# Patient Record
Sex: FEMALE | URBAN - METROPOLITAN AREA
[De-identification: names, ages, dates, MRNs, and addresses within clinical notes are randomized per-mention and may not be internally consistent; named-entity substitution may affect disease eponyms.]

---

## 2020-04-07 ENCOUNTER — OFFICE VISIT (OUTPATIENT)
Dept: URBAN - METROPOLITAN AREA CLINIC 62 | Facility: CLINIC | Age: 70
End: 2020-04-07
Payer: MEDICARE

## 2020-04-07 DIAGNOSIS — H49.21 SIXTH [ABDUCENT] NERVE PALSY, RIGHT EYE: Primary | ICD-10-CM

## 2020-04-07 DIAGNOSIS — E05.00 THYROTOXICOSIS WITH DIFFUSE GOITER WITHOUT THYROTOXIC CRISIS: ICD-10-CM

## 2020-04-07 DIAGNOSIS — H25.13 AGE-RELATED NUCLEAR CATARACT, BILATERAL: ICD-10-CM

## 2020-04-07 DIAGNOSIS — H40.013 OPEN ANGLE WITH BORDERLINE FINDINGS, LOW RISK, BILATERAL: ICD-10-CM

## 2020-04-07 PROCEDURE — 92133 CPTRZD OPH DX IMG PST SGM ON: CPT | Performed by: OPHTHALMOLOGY

## 2020-04-07 PROCEDURE — 92004 COMPRE OPH EXAM NEW PT 1/>: CPT | Performed by: OPHTHALMOLOGY

## 2020-04-07 ASSESSMENT — INTRAOCULAR PRESSURE
OD: 14
OS: 14

## 2020-04-07 NOTE — IMPRESSION/PLAN
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 Plan: R/O thyroid eye disease. with Free T3, T4, TSI. CT with and without contrast orbits (bun and cr, if warranted).

## 2020-04-07 NOTE — IMPRESSION/PLAN
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 Plan: Pt with history right eye lateral gaze palsy 15 years ago. High suspicion thyroid eye disease as has some symptoms of diplopia. Rec. imaging orbits and eval specialist.  Blood work for thyroid and glucose.

## 2020-04-07 NOTE — IMPRESSION/PLAN
Impression: Open angle with borderline findings, low risk, bilateral: H40.013. Plan: OCT and IOP NML. Will continue to observe.  + C/D assymetry and family hx. OCT with artifact OS. Repeat in the future. Check VF. No treatment at this time.

## 2020-04-09 ENCOUNTER — OFFICE VISIT (OUTPATIENT)
Dept: URBAN - METROPOLITAN AREA CLINIC 62 | Facility: CLINIC | Age: 70
End: 2020-04-09
Payer: MEDICARE

## 2020-04-09 DIAGNOSIS — H53.2 DIPLOPIA: ICD-10-CM

## 2020-04-09 DIAGNOSIS — H50.9 STRABISMUS: ICD-10-CM

## 2020-04-09 PROCEDURE — 99215 OFFICE O/P EST HI 40 MIN: CPT | Performed by: OPHTHALMOLOGY

## 2020-04-09 PROCEDURE — 92133 CPTRZD OPH DX IMG PST SGM ON: CPT | Performed by: OPHTHALMOLOGY

## 2020-04-09 PROCEDURE — 92083 EXTENDED VISUAL FIELD XM: CPT | Performed by: OPHTHALMOLOGY

## 2020-04-09 ASSESSMENT — INTRAOCULAR PRESSURE
OS: 14
OD: 13

## 2020-04-09 NOTE — IMPRESSION/PLAN
Impression: Thyrotoxicosis w diffuse goiter w/o thyrotoxic crisis: E05.00. History of Graves, ~15years ago. Treated with Metizol. Per patient, disease burned out. Right side proptosis. Eye deviation with pressure sensation OD. Per patient, orbit symptoms were not treated. Symptoms self resolved within ~6months. Patient does not/nor has she been a smoker. Plan: Discussed diagnosis in detail with patient. Discussed treatment plan with patient. Possible TRO in non congestive, non inflammatory, or compressive phase. Note worthy for ONLY strabismus, with no other mechanical symptoms or findings upon exam. Possible isolated 6th Nerve Palsy of different etiology. Will call patient on Monday, once we receive TSI from [address] [place] [address], If negative, patient's findings are more likely than not, unrelated to old TRO. Currently EU Thyroid after graves. Recommend patient schedule a Neuro Ophthalmologist Evaluation *ASAP* for presumed 6th Nerve Palsy. *Patient is currently being treated during COVID-19 epidemic, which limits our availability to establish proper follow up care. Patient understands these limitations, and is aware that we will continue treatment and follow up based on our availability, as determined by local state and federal guidelines.

## 2020-04-16 ENCOUNTER — TELEPHONE (OUTPATIENT)
Dept: OPHTHALMOLOGY | Facility: CLINIC | Age: 70
End: 2020-04-16

## 2020-04-16 NOTE — TELEPHONE ENCOUNTER
Odilia Gan [phone]      Pt in [place] and unable to locate neuro-ophthalmologist for evaluation of CN VI and CN III palsy starting 2 weeks ago and worsening eye restrictions of eye movement in past week  No pupil involvement and visual acuity stable per pt  No other symptoms  CT no MRI done per pt    Reviewed with neuro-clinic-- virtual exam would not be appropriate for proper evaluation    Pt able to fly to [place] if indicated per pt    Scheduled with Dr. Cabral next Friday at 0800  Pt aware of date/time/location    Pt may review with general practioner if comfortable ordering MRI brain/orbits with and without contrast prior to visit and MRA if CTA not previously ordered if symptoms having continued to worsen-- pt to bring hard copies    Ophthalmology notes to be sent to eye clinic    Note to facilitator  Severo Hogue RN 3:44 PM 04/16/20            [facility]    Phone Message    May a detailed message be left on voicemail: yes     Reason for Call: Other: Pt is requesting an appt with Dr. Lonny WERNER. She states her ophthalmologist in [place] recommended seeing a neuro ophthahlmologist ASAP due to her increasing vision loss. Referral will come from Dr. David Gonzalez of [facility] in [place], [place]. Pt states she had a recent evaluation and CT scan done at their offices. She mentioned they have ruled out glaucoma and thyroid eye disease, and she has hx of cranial nerve palsy.   Pt notes she is from the [facility] and is hoping to see provider either virtually or in person; she states there are no available providers in her area. Writer directed pt to have records and referral sent. Please advise on next steps.     Action Taken: Message routed to:  [facility] Ophthalmology    Travel Screening: Not Applicable

## 2020-04-16 NOTE — TELEPHONE ENCOUNTER
[facility] Call [facility]    Phone Message    May a detailed message be left on voicemail: no     Reason for Call: Other: Pt is returning Severo's call, please call pt back     Action Taken: Message routed to:  [facility] Eye    Travel Screening: Not Applicable

## 2020-06-22 ENCOUNTER — TRANSFERRED RECORDS (OUTPATIENT)
Dept: HEALTH INFORMATION MANAGEMENT | Facility: CLINIC | Age: 70
End: 2020-06-22
Payer: MEDICARE

## 2021-02-24 ENCOUNTER — TRANSFERRED RECORDS (OUTPATIENT)
Dept: HEALTH INFORMATION MANAGEMENT | Facility: CLINIC | Age: 71
End: 2021-02-24
Payer: MEDICARE

## 2021-08-06 NOTE — TELEPHONE ENCOUNTER
RECORDS RECEIVED FROM: internal    DATE RECEIVED: 11.8.21   NOTES STATUS DETAILS   OFFICE NOTE from referring provider na Self referred    OFFICE NOTE from other specialist na    DISCHARGE SUMMARY from hospital na    DISCHARGE REPORT from the ER na    MEDICATION LIST internal     IMAGING  (NEED IMAGES AND REPORTS)     CT SCAN In process     CHEST XRAY (CXR) In process     TESTS     PULMONARY FUNCTION TESTING (PFT) internal  Scheduled 11.8.21        Action 8.6.21 sv   Action Taken Message sent to nurse pool to place PFT and CXR orders

## 2021-08-17 ENCOUNTER — TELEPHONE (OUTPATIENT)
Facility: CLINIC | Age: 71
End: 2021-08-17

## 2021-08-17 NOTE — TELEPHONE ENCOUNTER
Audra Bonilla Kathryn, MD; Ptati Ku RN Hello,   Rescheduled this patient to our [facility], she would like have you review her chart to see if  you can help her she is concerned that if she comes all the way here you might not be able to help her. She says she has multiple symptoms from Toxic Carpet Fumes. Told the patient I would message you for review her chart but to keep the appointment since you cover a wide variety of issues. Sent her date/Time and location. She would like a call back to discuss if possible.   Thank you, MS       Called and spoke to patient. She reported that she recently moved here from [place] where she was exposed to volatile toxic chemicals through her carpet. 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 She has been experiencing a productive cough, sinus headaches, double vision, severe fatigue and has fibroids in her lungs. No history of any diagnosed pulmonary conditions. She had imaging done in [place] for the fibroids and has the images on her person, she will bring to first appointment. She recently saw a doctor and had an unpleasant experience and wanted to ensure she would not be dismissed. I encouraged her to attend her upcoming visit with Dr. Chinchilla.      Patti Ku RN

## 2021-08-18 ENCOUNTER — TELEPHONE (OUTPATIENT)
Dept: PULMONOLOGY | Facility: CLINIC | Age: 71
End: 2021-08-18

## 2021-08-27 ENCOUNTER — TELEPHONE (OUTPATIENT)
Dept: OPHTHALMOLOGY | Facility: CLINIC | Age: 71
End: 2021-08-27

## 2021-08-27 NOTE — TELEPHONE ENCOUNTER
[facility] Call Center    Phone Message    May a detailed message be left on voicemail: yes     Reason for Call: Other: Pt calling to schedule appt with Dr. Juarez relating to a chemical exposure she had in 2019. Pt had a previously scheduled appt in 2020 to see Dr. Cabral. Writer instructed pt to obtain notes and referral from provider in [place], but she wanted to ensure it would be appropriate to see Dr. Juarez instead of Dr. Cabral. Please review and follow up w/ Pt as needed.      Action Taken: Message routed to:  [facility] Eye    Travel Screening: Not Applicable

## 2021-08-30 ENCOUNTER — TRANSCRIBE ORDERS (OUTPATIENT)
Dept: OTHER | Age: 71
End: 2021-08-30

## 2021-08-30 DIAGNOSIS — Z86.018: Primary | ICD-10-CM

## 2021-08-31 NOTE — TELEPHONE ENCOUNTER
Diplopia intermittent in all fields, goes away with closing either eye.      Deficits in eye movement    Acuity might be affected.     Around chemicals it worsens .     Myasthenia tests negative     She will try to get MRI images from [place]          Daisy Portillo on 8/31/2021 at 1:18 PM

## 2021-08-31 NOTE — TELEPHONE ENCOUNTER
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 Juarez        Action Taken: Message routed to:  [facility] OPHTHALMOLOGY ADULT [facility] [[id number]] - dx not in protocols    Travel Screening: Not Applicable

## 2021-09-14 ENCOUNTER — MEDICAL CORRESPONDENCE (OUTPATIENT)
Dept: HEALTH INFORMATION MANAGEMENT | Facility: CLINIC | Age: 71
End: 2021-09-14
Payer: MEDICARE

## 2021-09-14 ENCOUNTER — TRANSFERRED RECORDS (OUTPATIENT)
Dept: HEALTH INFORMATION MANAGEMENT | Facility: CLINIC | Age: 71
End: 2021-09-14
Payer: MEDICARE

## 2021-09-24 ENCOUNTER — TELEPHONE (OUTPATIENT)
Dept: PULMONOLOGY | Facility: CLINIC | Age: 71
End: 2021-09-24

## 2021-09-30 ENCOUNTER — OFFICE VISIT (OUTPATIENT)
Dept: OPHTHALMOLOGY | Facility: CLINIC | Age: 71
End: 2021-09-30
Attending: OPHTHALMOLOGY
Payer: MEDICARE

## 2021-09-30 DIAGNOSIS — H50.51 ESOPHORIA: ICD-10-CM

## 2021-09-30 DIAGNOSIS — H53.10 SUBJECTIVE VISUAL DISTURBANCE: ICD-10-CM

## 2021-09-30 DIAGNOSIS — H50.05 ESOTROPIA, ALTERNATING: Primary | ICD-10-CM

## 2021-09-30 DIAGNOSIS — H53.10 SUBJECTIVE VISUAL DISTURBANCE: Primary | ICD-10-CM

## 2021-09-30 PROCEDURE — 92060 SENSORIMOTOR EXAMINATION: CPT | Performed by: OPHTHALMOLOGY

## 2021-09-30 PROCEDURE — G0463 HOSPITAL OUTPT CLINIC VISIT: HCPCS | Mod: 25

## 2021-09-30 PROCEDURE — 99204 OFFICE O/P NEW MOD 45 MIN: CPT | Mod: GC | Performed by: OPHTHALMOLOGY

## 2021-09-30 RX ORDER — METOPROLOL TARTRATE 25 MG/1
12.5 TABLET, FILM COATED ORAL EVERY MORNING
COMMUNITY

## 2021-09-30 RX ORDER — FLECAINIDE ACETATE 100 MG/1
100 TABLET ORAL 2 TIMES DAILY
COMMUNITY

## 2021-09-30 ASSESSMENT — VISUAL ACUITY
METHOD: SNELLEN - LINEAR
OS_CC: 20/40
OS_PH_CC: 20/25
CORRECTION_TYPE: GLASSES
OD_CC: 20/40
OD_PH_CC: 20/25

## 2021-09-30 ASSESSMENT — EXTERNAL EXAM - LEFT EYE: OS_EXAM: NORMAL

## 2021-09-30 ASSESSMENT — REFRACTION_WEARINGRX
OD_AXIS: 170
OD_ADD: +3.00
OD_CYLINDER: +0.75
OS_ADD: +3.00
OD_SPHERE: +0.75
OS_AXIS: 025
OS_CYLINDER: +0.25
OS_SPHERE: +1.00

## 2021-09-30 ASSESSMENT — SLIT LAMP EXAM - LIDS
COMMENTS: NORMAL
COMMENTS: NORMAL

## 2021-09-30 ASSESSMENT — CUP TO DISC RATIO
OD_RATIO: 0.2
OS_RATIO: 0.1

## 2021-09-30 ASSESSMENT — TONOMETRY
OS_IOP_MMHG: 10
IOP_METHOD: ICARE
OD_IOP_MMHG: 11

## 2021-09-30 ASSESSMENT — CONF VISUAL FIELD
METHOD: COUNTING FINGERS
OS_NORMAL: 1
OD_NORMAL: 1

## 2021-09-30 ASSESSMENT — EXTERNAL EXAM - RIGHT EYE: OD_EXAM: NORMAL

## 2021-09-30 NOTE — PATIENT INSTRUCTIONS
You have been diagnosed with Dry eye syndrome.  Symptoms of Dry eye syndrome can include double vision, eye pain, blurry vision, stinging, burning, tearing, eye redness.      Some useful instructions are listed below:     1.  Use artificial tears on a regular basis.  If you use artificial tear drops with preservative, you can use them up to 4-6 times per day. If you use artificial tear drops without preservatives, then you can use them more often.      2.  Wash your eyelashes with hot water.  Do not make the water so hot that you burn yourself, but the water should be more than just warm.  Often patients will wash their eyelashes in the shower under the hot water.  You should rub gently along the base of your eyelashes.      3.  Taking fish oil capsules twice a day for a month and then once a day after that can help improve Dry eye symptoms.      4.  Drink a lot of water.  Hydration can be helpful.      5.  Humidify your environment.      6.  If this is not beneficial, other treatments can include punctal plugs or cautery, corticosteroid eye drops, Restasis, Lipiflow, or autologous serum.  If you are still struggling with dry eye symptoms, call Dr. Juarez's office for other therapies or a referral to a dry eye specialist.

## 2021-09-30 NOTE — PROGRESS NOTES
Assessment & Plan     Odilia Gan is a 70 year old female with the following diagnoses:   1. Esotropia, alternating    2. Subjective visual disturbance    3. Esophoria         Patient was sent for consultation by Dr. Lydia Lyons for diplopia and eye movement deficits.     HPI:  March 2020 binocular horizontal diplopia. Saw an ophthalmologist and neuro-ophthalmologist in [place]. Started steroids in April. She saw Ramakrishna Hastings MD neurologist in [place] who she reports that she had lab studies and a rep stim       and single fiber EMG that were unremarkable. She tried pyridostigmine around June 2020 and continued this until about one month ago when she self-discontinued it.     Eye ROS positive. Loss of central vision. Half a dozen episodes of painless central vision loss around the same time.  She thinks possible relation to volatile organic compounds.      Past ocular history: denies     Independent historians: Patient    Review of outside testing:   CT orbits 4/9/20  Final report:  The right lateral rectus muscle appears relatively atrophic.  There is normal prominence of the bilateral medial rectus muscles and of the right inferior rectus muscle.    History of prior diagnosis of thyroid ophthalmopathy has become available.  Cannot exclude minimal extraocular muscles enlargement on this basis.    MRI brain 4/21/20  Impression:  1. No acute intracranial abnormality  2. Mild senescent changes, compatible age  3. Prior sinus surgery.  Treatment Coastal thickening bilateral ethmoid and maxillary antral regions.    My interpretation performed today of outside testing:  N/A    Review of outside clinical notes:  None available    Past medical history:  Graves' disease - diagnosed in the 1990s - apparently took a medication that treated it that wasn't radioiodine.   YOHANNES  Ventricular arrhythmia    Medications:  Flecainide     Family history / social history:  Multiple autoimmune diseases mom and sister  Glaucoma  in grandmother, no ARMD in the family    Exam:  Visual acuity with correction was 20/25 in the right eye and 20/25 in the left eye with pinhole. IOP was 11 in the right eye and 10 in the left eye. Visual fields were full in both eyes. Ocular motility was full in all gaze directions. Color plates were 11/11 in the right eye and 11/11 in the left eye.  Pupils were equal, round and reactive to light with no RAPD.     Strabismus exam: Flick exophoria in primary gaze, esophoria in RIGHT and left gaze.     Anterior segment exam: Radial keratectomy scars both eyes.  Dryness OU  Dilated fundus exam: Normal.     Tests ordered and interpreted today:  Sensorimotor     Assessment/Plan:   It is my impression that patient has esophoria that is worse with right and left gaze. We discussed that prisms may be beneficial but would become more reliant on them.  Patient does not wear glasses all the time and only has intermittent double vision so will hold off at this time.  I do not think this is related to myasthenia.  No further workup necessary at this time. This may be early sagging eye syndrome vs. Breakdown of esophoria.      The intermittent monocular diplopia in the RIGHT eye observed in the office today is due to Dry eye syndrome. I asked the patient to use artificial tears as well as warm compresses to the eyelid margin  on a regular basis.  I asked the patient to take fish oil capsules 2x/day for a month and then 1x/d thereafter.   If that is not beneficial we could consider punctal plugs, corticosteroid eye drops and Restasis.  Could consider seeing a dry eye specialist if more bothersome.             Attending Physician Attestation:  Complete documentation of historical and exam elements from today's encounter can be found in the full encounter summary report (not reduplicated in this progress note).  I personally obtained the chief complaint(s) and history of present illness.  I confirmed and edited as necessary the  review of systems, past medical/surgical history, family history, social history, and examination findings as documented by others; and I examined the patient myself.  I personally reviewed the relevant tests, images, and reports as documented above.  I formulated and edited as necessary the assessment and plan and discussed the findings and management plan with the patient and family. I personally reviewed the ophthalmic test(s) associated with this encounter, agree with the interpretation(s) as documented by the resident/fellow, and have edited the corresponding report(s) as necessary.  - Syed Joseph, DO   PGY-5 Neuro-Ophthalmology Fellow

## 2021-09-30 NOTE — Clinical Note
9/30/2021       RE: Odilia Gan  [address]  [place] [address]     Dear Colleague,    Thank you for referring your patient, Odilia Gan, to the [facility] at [facility]. Please see a copy of my visit note below.         Assessment & Plan     Odilia Gan is a 70 year old female with the following diagnoses:   1. Esotropia, alternating    2. Subjective visual disturbance    3. Esophoria         Patient was sent for consultation by Dr. Lydia Lyons for diplopia and eye movement deficits.     HPI:  March 2020 binocular horizontal diplopia. Saw an ophthalmologist and neuro-ophthalmologist in [place]. Started steroids in April. She saw Ramakrishna Hastings MD neurologist in [place] who she reports that she had lab studies and a rep stim       and single fiber EMG that were unremarkable. She tried pyridostigmine around June 2020 and continued this until about one month ago when she self-discontinued it.     Eye ROS positive. Loss of central vision. Half a dozen episodes of painless central vision loss around the same time.  She thinks possible relation to volatile organic compounds.      Past ocular history: denies     Independent historians: Patient    Review of outside testing:   CT orbits 4/9/20  Final report:  The right lateral rectus muscle appears relatively atrophic.  There is normal prominence of the bilateral medial rectus muscles and of the right inferior rectus muscle.    History of prior diagnosis of thyroid ophthalmopathy has become available.  Cannot exclude minimal extraocular muscles enlargement on this basis.    MRI brain 4/21/20  Impression:  1. No acute intracranial abnormality  2. Mild senescent changes, compatible age  3. Prior sinus surgery.  Treatment Coastal thickening bilateral ethmoid and maxillary antral regions.    My interpretation performed today of outside testing:  N/A    Review of outside clinical  notes:  None available    Past medical history:  Graves' disease - diagnosed in the 1990s - apparently took a medication that treated it that wasn't radioiodine.   YOHANNES  Ventricular arrhythmia    Medications:  Flecainide     Family history / social history:  Multiple autoimmune diseases mom and sister  Glaucoma in grandmother, no ARMD in the family    Exam:  Visual acuity with correction was 20/25 in the right eye and 20/25 in the left eye with pinhole. IOP was 11 in the right eye and 10 in the left eye. Visual fields were full in both eyes. Ocular motility was full in all gaze directions. Color plates were 11/11 in the right eye and 11/11 in the left eye.  Pupils were equal, round and reactive to light with no RAPD.     Strabismus exam: Flick exophoria in primary gaze, esophoria in RIGHT and left gaze.     Anterior segment exam: Radial keratectomy scars both eyes.  Dryness OU  Dilated fundus exam: Normal.     Tests ordered and interpreted today:  Sensorimotor     Assessment/Plan:   It is my impression that patient has esophoria that is worse with right and left gaze. We discussed that prisms may be beneficial but would become more reliant on them.  Patient does not wear glasses all the time and only has intermittent double vision so will hold off at this time.  I do not think this is related to myasthenia.  No further workup necessary at this time. This may be early sagging eye syndrome vs. Breakdown of esophoria.      The intermittent monocular diplopia in the RIGHT eye observed in the office today is due to Dry eye syndrome. I asked the patient to use artificial tears as well as warm compresses to the eyelid margin  on a regular basis.  I asked the patient to take fish oil capsules 2x/day for a month and then 1x/d thereafter.   If that is not beneficial we could consider punctal plugs, corticosteroid eye drops and Restasis.  Could consider seeing a dry eye specialist if more bothersome.             Attending  Physician Attestation:  Complete documentation of historical and exam elements from today's encounter can be found in the full encounter summary report (not reduplicated in this progress note).  I personally obtained the chief complaint(s) and history of present illness.  I confirmed and edited as necessary the review of systems, past medical/surgical history, family history, social history, and examination findings as documented by others; and I examined the patient myself.  I personally reviewed the relevant tests, images, and reports as documented above.  I formulated and edited as necessary the assessment and plan and discussed the findings and management plan with the patient and family. I personally reviewed the ophthalmic test(s) associated with this encounter, agree with the interpretation(s) as documented by the resident/fellow, and have edited the corresponding report(s) as necessary.  - Syed Joseph, DO   PGY-5 Neuro-Ophthalmology Fellow              Again, thank you for allowing me to participate in the care of your patient.      Sincerely,    Syed Juarez MD

## 2021-09-30 NOTE — LETTER
2021         RE:  :  MRN: Odilia Gan  1950  [id number]     Dear Dr. Lyons,    Thank you for asking me to see your very pleasant patient, Odilia Gan, in neuro-ophthalmic consultation.  I would like to thank you for sending your records and I have summarized them in the history of present illness.My assessment and plan are below.  For further details, please see my attached clinic note.      Assessment & Plan     Odilia Gan is a 70 year old female with the following diagnoses:   1. Esotropia, alternating    2. Subjective visual disturbance    3. Esophoria         Patient was sent for consultation by Dr. Lydia Lyons for diplopia and eye movement deficits.     HPI:  2020 binocular horizontal diplopia. Saw an ophthalmologist and neuro-ophthalmologist in [place]. Started steroids in April. She saw Ramakrishna Hastings MD neurologist in [place] who she reports that she had lab studies and a rep stim       and single fiber EMG that were unremarkable. She tried pyridostigmine around 2020 and continued this until about one month ago when she self-discontinued it.     Eye ROS positive. Loss of central vision. Half a dozen episodes of painless central vision loss around the same time.  She thinks possible relation to volatile organic compounds.      Past ocular history: denies     Independent historians: Patient    Review of outside testing:   CT orbits 20  Final report:  The right lateral rectus muscle appears relatively atrophic.  There is normal prominence of the bilateral medial rectus muscles and of the right inferior rectus muscle.    History of prior diagnosis of thyroid ophthalmopathy has become available.  Cannot exclude minimal extraocular muscles enlargement on this basis.    MRI brain 20  Impression:  1. No acute intracranial abnormality  2. Mild senescent changes, compatible age  3. Prior sinus surgery.  Treatment Coastal thickening bilateral ethmoid and maxillary antral  regions.    My interpretation performed today of outside testing:  N/A    Review of outside clinical notes:  None available    Past medical history:  Graves' disease - diagnosed in the 1990s - apparently took a medication that treated it that wasn't radioiodine.   YOHANNES  Ventricular arrhythmia    Medications:  Flecainide     Family history / social history:  Multiple autoimmune diseases mom and sister  Glaucoma in grandmother, no ARMD in the family    Exam:  Visual acuity with correction was 20/25 in the right eye and 20/25 in the left eye with pinhole. IOP was 11 in the right eye and 10 in the left eye. Visual fields were full in both eyes. Ocular motility was full in all gaze directions. Color plates were 11/11 in the right eye and 11/11 in the left eye.  Pupils were equal, round and reactive to light with no RAPD.     Strabismus exam: Flick exophoria in primary gaze, esophoria in RIGHT and left gaze.     Anterior segment exam: Radial keratectomy scars both eyes.  Dryness OU  Dilated fundus exam: Normal.     Tests ordered and interpreted today:  Sensorimotor     Assessment/Plan:   It is my impression that patient has esophoria that is worse with right and left gaze. We discussed that prisms may be beneficial but would become more reliant on them.  Patient does not wear glasses all the time and only has intermittent double vision so will hold off at this time.  I do not think this is related to myasthenia.  No further workup necessary at this time. This may be early sagging eye syndrome vs. Breakdown of esophoria.      The intermittent monocular diplopia in the RIGHT eye observed in the office today is due to Dry eye syndrome. I asked the patient to use artificial tears as well as warm compresses to the eyelid margin  on a regular basis.  I asked the patient to take fish oil capsules 2x/day for a month and then 1x/d thereafter.   If that is not beneficial we could consider punctal plugs, corticosteroid eye drops and  Restasis.  Could consider seeing a dry eye specialist if more bothersome.     Again, thank you for allowing me to participate in the care of your patient.      Sincerely,    Syed Juarez MD  Professor  Ophthalmology Residency   Director of Neuro-Ophthalmology  [employer] [facility] [employer] Endowed Chair  Departments of Ophthalmology, Neurology, and Neurosurgery  [facility] [address] [place]  [address]  T - [phone]  F - [phone]  [email or web address]      CC: Lydia Lyons MD  [facility]  [address] [facility] [address]  Via Fax: [phone]     Zulma Chinchilla MD  [address]  [facility] [address]  Via In Basket    DX = esotropia

## 2021-09-30 NOTE — NURSING NOTE
Chief Complaints and History of Present Illnesses   Patient presents with     Diplopia Evaluation     Chief Complaint(s) and History of Present Illness(es)     Diplopia Evaluation               Comments     Odilia Gan is a 70 year old female who presents today for    1. Diplopia  Onset march 2020, after chemical exposure. Volatile organic compounds in the house from old carpet. Diagnosed by an  per patient account.   Binocular diplopia that improves with monocular closure of either eye. Diplopia is variable. Intermittent and worse in extreme positions, right worse than left. Was told she had an abduction deficit in the right eye that started after the chemical exposure.     Patient also complaining of inability to fully close her eyes when sleeping.   Patient used to do visual screenings.     Huey Lion CO 8:32 AM September 30, 2021

## 2021-11-08 ENCOUNTER — PRE VISIT (OUTPATIENT)
Dept: PULMONOLOGY | Facility: CLINIC | Age: 71
End: 2021-11-08

## 2021-11-08 ENCOUNTER — OFFICE VISIT (OUTPATIENT)
Dept: PULMONOLOGY | Facility: CLINIC | Age: 71
End: 2021-11-08
Attending: INTERNAL MEDICINE
Payer: MEDICARE

## 2021-11-08 VITALS
HEIGHT: 64 IN | WEIGHT: 124.9 LBS | HEART RATE: 61 BPM | DIASTOLIC BLOOD PRESSURE: 68 MMHG | BODY MASS INDEX: 21.32 KG/M2 | OXYGEN SATURATION: 98 % | SYSTOLIC BLOOD PRESSURE: 114 MMHG

## 2021-11-08 DIAGNOSIS — R05.3 CHRONIC COUGH: Primary | ICD-10-CM

## 2021-11-08 DIAGNOSIS — R91.8 PULMONARY NODULES: ICD-10-CM

## 2021-11-08 PROCEDURE — 99205 OFFICE O/P NEW HI 60 MIN: CPT | Performed by: INTERNAL MEDICINE

## 2021-11-08 RX ORDER — ESZOPICLONE 2 MG/1
2 TABLET, FILM COATED ORAL
COMMUNITY
Start: 2021-11-04

## 2021-11-08 RX ORDER — ZOLPIDEM TARTRATE 10 MG/1
10 TABLET ORAL
COMMUNITY
Start: 2021-10-29

## 2021-11-08 ASSESSMENT — MIFFLIN-ST. JEOR: SCORE: 1066.54

## 2021-11-08 ASSESSMENT — PAIN SCALES - GENERAL: PAINLEVEL: NO PAIN (0)

## 2021-11-08 NOTE — NURSING NOTE
"Chief Complaint   Patient presents with     New Patient     Chemical exposure and fibroids in lungs        Vitals:    11/08/21 1500   BP: 114/68   Pulse: 61   SpO2: 98%   Weight: 56.7 kg (124 lb 14.4 oz)   Height: 1.626 m (5' 4\")       Body mass index is 21.44 kg/m .    Monse Malik MA    "

## 2021-11-08 NOTE — PROGRESS NOTES
"Pulmonary Clinic New Patient Consult  Reason for Consult: cough; carpet fume exposure  History of Present Illness    Ms. 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    Ms. Gan states that her problems started about 4 years ago when she was exposed to VOCS from her carpet.  An  came in and labeled the fumes from the carpet as VOCs in 2017.  She has had problems with fatigue, visual disturbances, memory loss, confusion, cough productive of phlegm, headache, dizziness, sinus irritation, and rashes since then.  The symptoms have occurred off and on but are worse with exposure to almost any significant irritant.  She states that within 5 to 10 minutes of exposure to an irritant she will feel that her lungs are aching and will also develop rashes and fatigue.  She visited her sister in [place] a few weeks ago and experienced a flare of symptoms related to exposure to the glue that was used recently to install new carpet in her sisters house.  Unfortunately this prompted her to end her trip prematurely.  Ms. Gan reports low level continued exposure to VOCs through her furniture which was exposed at her previous residence and continued to emit odors even after moving.  She noticed symptoms overall worsened in March 2020 when she had to spend more time at home related to the COVID pandemic.  She tells me she developed double vision which was initially thought to be related to an ocular form of myasthenia gravis, although it does not sound like this diagnosis was supported by additional work up (Ophthalmology note 9/30/2021).    Her main respiratory symptoms include \"aching lungs\" and a cough productive of a small amount of phlegm.  The phlegm varies day today and she assumes that it is clear although she has never really inspected it " closely.  Outside of irritant exposure, she cannot identify any clear triggers for the cough.  She is unsure if anything improves the cough.  She denies any hemoptysis or fevers.  She has not used inhalers in the past.  She denies any shortness of breath at rest or dyspnea on exertion.  She is able to walk without any functional limitations.  Denies any personal history of asthma, recurrent pneumonias, or other personal history of lung disease.  She has sleep apnea for which she is supposed to be on CPAP although she sometimes needs to stop using this at night due to ongoing respiratory symptoms.    She is a never smoker.  She does not vape.  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  She did have exposure to chlorine products there.  Does not have any pets at home.  Denies any significant exposure to birds or hot tub or Jacuzzi shoes on a regular basis.  She does believe that she has some bedding stuffed with down feathers.      Review of Systems:  10 of 14 systems reviewed and are negative unless otherwise stated in HPI.    Past Medical History:   Diagnosis Date     Graves disease      YOHANNES (obstructive sleep apnea)      Strabismus        No past surgical history on file.    No family history on file.    Social History     Socioeconomic History     Marital status: Single     Spouse name: None     Number of children: None     Years of education: None     Highest education level: None   Occupational History     None   Tobacco Use     Smoking status: Never Smoker     Smokeless tobacco: Never Used   Substance and Sexual Activity     Alcohol use: Never     Drug use: None     Sexual activity: None   Other Topics Concern     None   Social History Narrative     None     Social Determinants of Health     Financial Resource Strain: Not on file   Food Insecurity: Not on file   Transportation Needs: Not on file   Physical Activity: Not on file   Stress: Not on file   Social  "Connections: Not on file   Intimate Partner Violence: Unknown     Fear of Current or Ex-Partner: No     Emotionally Abused: No     Physically Abused: Not on file     Sexually Abused: Not on file   Housing Stability: Not on file         Allergies   Allergen Reactions     Gluten Meal GI Disturbance     Gluten sensitive      Sulfa Drugs Rash         Current Outpatient Medications:      eszopiclone (LUNESTA) 2 MG tablet, Take 2 mg by mouth, Disp: , Rfl:      flecainide (TAMBOCOR) 100 MG tablet, Take 100 mg by mouth 2 times daily, Disp: , Rfl:      metoprolol tartrate (LOPRESSOR) 25 MG tablet, Take 12.5 mg by mouth every morning, Disp: , Rfl:      zolpidem (AMBIEN) 10 MG tablet, Take 10 mg by mouth, Disp: , Rfl:       Physical Exam:  /68   Pulse 61   Ht 1.626 m (5' 4\")   Wt 56.7 kg (124 lb 14.4 oz)   SpO2 98%   BMI 21.44 kg/m    GENERAL: Well developed, well nourished, alert, and in no apparent distress.  HEENT: Normocephalic, atraumatic. PERRL, EOMI.   NECK: supple, no masses  RESP:  Normal respiratory effort. No cyanosis or clubbing.  No audible wheezing.  ABDOMEN:  non-distended.   SKIN: . No rash.  NEURO: AAOx3.  Normal gait.  Fluent speech.  PSYCH: mentation appears normal.     Results:  PFTs: none obtained today.  Imaging (personally reviewed in clinic today):    Radiology reports, as provided by the patient were reviewed:    CT chest from February 24, 2021: Noncalcified pulmonary nodules in the right lung measuring 3 to 5 mm.  Stable compared with previous imaging from June 22, 2020.  Otherwise clear lungs.    Chest x-ray September 14, 2021: Mild hyperinflation.  Mild biapical pleural thickening and minimal linear scarring in the left lung base.    Assessment and Plan:   Odilia Gan is a 71 year old female with a history of volatile organic compounds exposure who presents to pulmonary clinic today for further evaluation and discussion of previous chest imaging and chronic cough.  1.  Chronic cough " "in the context of volatile organic compounds exposure.  We discussed that the 3 most common causes of chronic cough in adults or some combination of acid reflux, postnasal drip, and asthma/reactive airways disease.  In the context of known VOC exposure it is possible that her cough is related to some form of low-dose or subacute reactive airways dysfunction syndrome or irritant induced asthma.  Unfortunately Ms. Gan did not have PFTs prior to her appointment today for review.  To solidify either of these diagnoses we would need to show either obstruction on baseline PFTs or we would need a positive methacholine challenge test.  We further discussed that irritant induced asthma would be treated essentially the same as  \"regular\" asthma, with trigger avoidance and inhalers.  I recommended full PFTs, possibly with methacholine challenge testing and/or an empiric trial of asthma inhalers today.  After review of the above information, Ms. Gan preferred to defer additional testing and did not wish to pursue inhaler therapy at this time.  We did also discuss consideration of treatment of other common causes of chronic cough including lifestyle modifications and either a PPI or H2 blocker for possible GERD as well as Flonase and/or an OTC anti-histamine for post nasal drip.  She will take these under consideration.  2. Pulmonary nodule.  A few, sub 6 mm pulmonary nodules were reported on CT chest.  We reviewed that  is a low risk patient (never smoker, no personal history of malignancy)  Based on this, Fleischner criteria suggest optional 1 year follow up for pulmonary nodule surveillance.  She has 2 scans which are 8 months apart and demonstrate stability of nodules.  We discussed there is no need for further dedicated follow up of the nodules.  For completions sake, we will request images from the scans be pushed to our system for review.  The above findings and plan were discussed at length with Ms. " Malik.  Questions and concerns were answered to the patient's satisfaction.  she was provided with my contact information should new questions or concerns arise in the interim.  Return to clinic as needed.   Up to date on a seasonal influenza vaccine.      Angelica Chinchilla MD  Pulmonary and Critical Care Medicine    The above note was dictated using voice recognition software and may include typographical errors. Please contact the author for any clarifications.    I spent 65 minutes on the date of encounter doing chart review, review of outside records, review of test results, conducting the patient visit, completing documentation and further activities as noted above.

## 2021-11-08 NOTE — LETTER
11/8/2021         RE: Odilia Gan  [address]  [address]  [place] [address]        Dear Colleague,    Thank you for referring your patient, Odilia Gan, to the [facility] [place]. Please see a copy of my visit note below.    Pulmonary Clinic New Patient Consult  Reason for Consult: cough; carpet fume exposure  History of Present Illness    Ms. 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    Ms. Gan states that her problems started about 4 years ago when she was exposed to VOCS from her carpet.  An  came in and labeled the fumes from the carpet as VOCs in 2017.  She has had problems with fatigue, visual disturbances, memory loss, confusion, cough productive of phlegm, headache, dizziness, sinus irritation, and rashes since then.  The symptoms have occurred off and on but are worse with exposure to almost any significant irritant.  She states that within 5 to 10 minutes of exposure to an irritant she will feel that her lungs are aching and will also develop rashes and fatigue.  She visited her sister in [place] a few weeks ago and experienced a flare of symptoms related to exposure to the glue that was used recently to install new carpet in her sisters house.  Unfortunately this prompted her to end her trip prematurely.  Ms. Gan reports low level continued exposure to VOCs through her furniture which was exposed at her previous residence and continued to emit odors even after moving.  She noticed symptoms overall worsened in March 2020 when she had to spend more time at home related to the COVID pandemic.  She tells me she developed double vision which was initially thought to be related to an ocular form of myasthenia gravis, although it does not sound like this diagnosis was supported by  "additional work up (Ophthalmology note 9/30/2021).    Her main respiratory symptoms include \"aching lungs\" and a cough productive of a small amount of phlegm.  The phlegm varies day today and she assumes that it is clear although she has never really inspected it closely.  Outside of irritant exposure, she cannot identify any clear triggers for the cough.  She is unsure if anything improves the cough.  She denies any hemoptysis or fevers.  She has not used inhalers in the past.  She denies any shortness of breath at rest or dyspnea on exertion.  She is able to walk without any functional limitations.  Denies any personal history of asthma, recurrent pneumonias, or other personal history of lung disease.  She has sleep apnea for which she is supposed to be on CPAP although she sometimes needs to stop using this at night due to ongoing respiratory symptoms.    She is a never smoker.  She does not vape.  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  She did have exposure to chlorine products there.  Does not have any pets at home.  Denies any significant exposure to birds or hot tub or Jacuzzi shoes on a regular basis.  She does believe that she has some bedding stuffed with down feathers.      Review of Systems:  10 of 14 systems reviewed and are negative unless otherwise stated in HPI.    Past Medical History:   Diagnosis Date     Graves disease      YOHANNES (obstructive sleep apnea)      Strabismus        No past surgical history on file.    No family history on file.    Social History     Socioeconomic History     Marital status: Single     Spouse name: None     Number of children: None     Years of education: None     Highest education level: None   Occupational History     None   Tobacco Use     Smoking status: Never Smoker     Smokeless tobacco: Never Used   Substance and Sexual Activity     Alcohol use: Never     Drug use: None     Sexual activity: None   Other Topics " "Concern     None   Social History Narrative     None     Social Determinants of Health     Financial Resource Strain: Not on file   Food Insecurity: Not on file   Transportation Needs: Not on file   Physical Activity: Not on file   Stress: Not on file   Social Connections: Not on file   Intimate Partner Violence: Unknown     Fear of Current or Ex-Partner: No     Emotionally Abused: No     Physically Abused: Not on file     Sexually Abused: Not on file   Housing Stability: Not on file         Allergies   Allergen Reactions     Gluten Meal GI Disturbance     Gluten sensitive      Sulfa Drugs Rash         Current Outpatient Medications:      eszopiclone (LUNESTA) 2 MG tablet, Take 2 mg by mouth, Disp: , Rfl:      flecainide (TAMBOCOR) 100 MG tablet, Take 100 mg by mouth 2 times daily, Disp: , Rfl:      metoprolol tartrate (LOPRESSOR) 25 MG tablet, Take 12.5 mg by mouth every morning, Disp: , Rfl:      zolpidem (AMBIEN) 10 MG tablet, Take 10 mg by mouth, Disp: , Rfl:       Physical Exam:  /68   Pulse 61   Ht 1.626 m (5' 4\")   Wt 56.7 kg (124 lb 14.4 oz)   SpO2 98%   BMI 21.44 kg/m    GENERAL: Well developed, well nourished, alert, and in no apparent distress.  HEENT: Normocephalic, atraumatic. PERRL, EOMI.   NECK: supple, no masses  RESP:  Normal respiratory effort. No cyanosis or clubbing.  No audible wheezing.  ABDOMEN:  non-distended.   SKIN: . No rash.  NEURO: AAOx3.  Normal gait.  Fluent speech.  PSYCH: mentation appears normal.     Results:  PFTs: none obtained today.  Imaging (personally reviewed in clinic today):    Radiology reports, as provided by the patient were reviewed:    CT chest from February 24, 2021: Noncalcified pulmonary nodules in the right lung measuring 3 to 5 mm.  Stable compared with previous imaging from June 22, 2020.  Otherwise clear lungs.    Chest x-ray September 14, 2021: Mild hyperinflation.  Mild biapical pleural thickening and minimal linear scarring in the left lung " "base.    Assessment and Plan:   Odilia Gan is a 71 year old female with a history of volatile organic compounds exposure who presents to pulmonary clinic today for further evaluation and discussion of previous chest imaging and chronic cough.  1.  Chronic cough in the context of volatile organic compounds exposure.  We discussed that the 3 most common causes of chronic cough in adults or some combination of acid reflux, postnasal drip, and asthma/reactive airways disease.  In the context of known VOC exposure it is possible that her cough is related to some form of low-dose or subacute reactive airways dysfunction syndrome or irritant induced asthma.  Unfortunately Ms. Gan did not have PFTs prior to her appointment today for review.  To solidify either of these diagnoses we would need to show either obstruction on baseline PFTs or we would need a positive methacholine challenge test.  We further discussed that irritant induced asthma would be treated essentially the same as  \"regular\" asthma, with trigger avoidance and inhalers.  I recommended full PFTs, possibly with methacholine challenge testing and/or an empiric trial of asthma inhalers today.  After review of the above information, Ms. Gan preferred to defer additional testing and did not wish to pursue inhaler therapy at this time.  We did also discuss consideration of treatment of other common causes of chronic cough including lifestyle modifications and either a PPI or H2 blocker for possible GERD as well as Flonase and/or an OTC anti-histamine for post nasal drip.  She will take these under consideration.  2. Pulmonary nodule.  A few, sub 6 mm pulmonary nodules were reported on CT chest.  We reviewed that  is a low risk patient (never smoker, no personal history of malignancy)  Based on this, Fleischner criteria suggest optional 1 year follow up for pulmonary nodule surveillance.  She has 2 scans which are 8 months apart and " demonstrate stability of nodules.  We discussed there is no need for further dedicated follow up of the nodules.  For completions sake, we will request images from the scans be pushed to our system for review.  The above findings and plan were discussed at length with Ms. Gan.  Questions and concerns were answered to the patient's satisfaction.  she was provided with my contact information should new questions or concerns arise in the interim.  Return to clinic as needed.   Up to date on a seasonal influenza vaccine.      Angelica Chinchilla MD  Pulmonary and Critical Care Medicine    The above note was dictated using voice recognition software and may include typographical errors. Please contact the author for any clarifications.    I spent 65 minutes on the date of encounter doing chart review, review of outside records, review of test results, conducting the patient visit, completing documentation and further activities as noted above.

## 2022-03-27 ENCOUNTER — HEALTH MAINTENANCE LETTER (OUTPATIENT)
Age: 72
End: 2022-03-27

## 2022-09-24 ENCOUNTER — HEALTH MAINTENANCE LETTER (OUTPATIENT)
Age: 72
End: 2022-09-24

## 2023-01-09 ENCOUNTER — VIRTUAL VISIT (OUTPATIENT)
Dept: PULMONOLOGY | Facility: CLINIC | Age: 73
End: 2023-01-09
Payer: MEDICARE

## 2023-01-09 DIAGNOSIS — R91.8 PULMONARY NODULES: Primary | ICD-10-CM

## 2023-01-09 DIAGNOSIS — R05.3 CHRONIC COUGH: ICD-10-CM

## 2023-01-09 PROCEDURE — 99215 OFFICE O/P EST HI 40 MIN: CPT | Mod: 95 | Performed by: INTERNAL MEDICINE

## 2023-01-09 RX ORDER — PRAMIPEXOLE DIHYDROCHLORIDE 0.12 MG/1
TABLET ORAL
COMMUNITY
Start: 2022-10-19

## 2023-01-09 NOTE — LETTER
Date:January 10, 2023      Patient was self referred, no letter generated. Do not send.        [facility] Health Information

## 2023-01-09 NOTE — LETTER
"    1/9/2023         RE: Odilia Gan  [address]  [address]  [place] [address]        Dear Colleague,    Thank you for referring your patient, Odilia Gan, to the [facility] [place]. Please see a copy of my visit note below.    Odilia is a 72 year old who is being evaluated via a billable video visit.      How would you like to obtain your AVS? MyChart  If the video visit is dropped, the invitation should be resent by: Text to cell phone: [phone]  Will anyone else be joining your video visit? No        Video-Visit Details    Type of service:  Video Visit   Video Start Time: 3:05 pm  Video End Time: 3:21 pm    Originating Location (pt. Location): Home    Distant Location (provider location):  On-site  Platform used for Video Visit: [facility]     Pulmonary Clinic Return Visit  History of Present Illness    Ms. Odilia Gan is a 72 yof with a history of YOHANNES on CPAP who presents to pulmonary clinic today for follow up.  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  Please see my note from 11/8/2021 for additional details.      To briefly summarize, she had complaints of aching lungs and a cough productive of a small amount of clear phlegm dating back about 4 years, to around the time she was exposed to VOCs in her carpet. We discussed that her cough could be related to some form of low-dose or subacute reactive airways dysfunction syndrome or irritant induced asthma.  I suggested that PFTs and potentially a methacholine challenge test would be needed to solidify these diagnoses and that either would be treated inhalers and irritant avoidance similar to \"regular asthma.  We also reviewed other potential causes of chronic cough.  PFT testing was recommended but Ms. Gan declined to proceed with any PFT testing or empiric trials of inhalers.  With " respect to her pulmonary nodules, a few, sub 6 mm pulmonary nodules were reported on CT chest.  We reviewed that  is a low risk patient (never smoker, no personal history of malignancy)  Based on this, Fleischner criteria suggest optional 1 year follow up for pulmonary nodule surveillance.  She had 2 scans 8 months apart demonstrating stability of nodules.  Outside images were requested for review and we agreed that no further scans were needed for dedicated follow up given stability of nodules.    She returns to clinic today having had a recent CT coronary arteries in 11/2022 which reportedly showed a 5 mm RML nodule as well as linear atelectasis in the mid and lower lung zones. She presents to clinic today to discuss these findings further.    -Has had 6 recurrent respiratory infections since our last visit.  Last infection was 2 months ago.  -Is having sore throat, fevers, chills, and overall malaise and has been experiencing this for the last few weeks. Denies exposure to sick contacts and states no one else has gotten what she has so she is unsure if the cause is infectious.   -Developed cardiac arrhythmia while on a drug - this prompted her to have a cardiology evaluation with CT coronary arteries.  Cardiology notes from 11/9 are reviewed.  She had an echo which showed only mild MR with no other significant abnormalities.  The cardiac CT had no evidence of obstructive coronary disease with parenchymal lung findings as described above.  -Is planning to have to cancel a cruise she has been waiting to take for years because she is feeling so poorly and worries about risk of getting another infection onboard. Wonders if I can write a letter to the cruise company providing a medical exemption so she can get her money back      She is a never smoker.  She does not vape.  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  She did have exposure to chlorine products  there.  Does not have any pets at home.  Denies any significant exposure to birds or hot tub or Jacuzzi shoes on a regular basis.  She does believe that she has some bedding stuffed with down feathers.      Review of Systems:  10 of 14 systems reviewed and are negative unless otherwise stated in HPI.    Past Medical History:   Diagnosis Date     Graves disease      YOHANNES (obstructive sleep apnea)      Strabismus        No past surgical history on file.    No family history on file.    Social History     Socioeconomic History     Marital status: Single     Spouse name: None     Number of children: None     Years of education: None     Highest education level: None   Occupational History     None   Tobacco Use     Smoking status: Never Smoker     Smokeless tobacco: Never Used   Substance and Sexual Activity     Alcohol use: Never     Drug use: None     Sexual activity: None   Other Topics Concern     None   Social History Narrative     None     Social Determinants of Health     Financial Resource Strain: Not on file   Food Insecurity: Not on file   Transportation Needs: Not on file   Physical Activity: Not on file   Stress: Not on file   Social Connections: Not on file   Intimate Partner Violence: Unknown     Fear of Current or Ex-Partner: No     Emotionally Abused: No     Physically Abused: Not on file     Sexually Abused: Not on file   Housing Stability: Not on file         Allergies   Allergen Reactions     Gluten Meal GI Disturbance     Gluten sensitive      Sulfa Drugs Rash         Current Outpatient Medications:      eszopiclone (LUNESTA) 2 MG tablet, Take 2 mg by mouth, Disp: , Rfl:      flecainide (TAMBOCOR) 100 MG tablet, Take 100 mg by mouth 2 times daily, Disp: , Rfl:      metoprolol tartrate (LOPRESSOR) 25 MG tablet, Take 12.5 mg by mouth every morning, Disp: , Rfl:      pramipexole (MIRAPEX) 0.125 MG tablet, TAKE ONE TABLET BY MOUTH 90 MINUTES BEFORE SYMPTOMS OF RESTLESS LEG TYPICALLY OCCUR. INCREASE BY 1  TABLET EVERY 7 DAYS IF NEEDED. MAX OF 4 T, Disp: , Rfl:      zolpidem (AMBIEN) 10 MG tablet, Take 10 mg by mouth, Disp: , Rfl:       Physical Exam:  GENERAL: Healthy, alert and no distress  EYES: Eyes grossly normal to inspection.  No discharge or erythema, or obvious scleral/conjunctival abnormalities.  RESP: No audible wheeze, cough, or visible cyanosis.  No visible retractions or increased work of breathing.    SKIN: Visible skin clear. No significant rash, abnormal pigmentation or lesions.  NEURO: Cranial nerves grossly intact.  Mentation and speech appropriate for age.  PSYCH: Mentation appears normal, affect normal/bright, judgement and insight intact, normal speech and appearance well-groomed.    Results:  CT Coronaries from November 2022 as described in HPI    Radiology reports from before, as provided by the patient were reviewed with reports scanned into our record.     CT chest from February 24, 2021: Noncalcified pulmonary nodules in the right lung measuring 3 to 5 mm.  Stable compared with previous imaging from June 22, 2020.  Otherwise clear lungs.    Chest x-ray September 14, 2021: Mild hyperinflation.  Mild biapical pleural thickening and minimal linear scarring in the left lung base.    Assessment and Plan:   Odilia Gan is a 72 year old female with a history of volatile organic compounds exposure who presents to pulmonary clinic today for further evaluation and discussion of previous chest imaging and chronic cough.  1.  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  I again reviewed with Ms. Gan that it is possible that her cough is related to some form of low-dose or subacute reactive airways dysfunction syndrome or irritant induced asthma.  We also discussed it could also be related to some combination of acid reflux or post nasal drip, as both are common causes of cough.  I reiterated to her multiple times that in  order to proceed with further work up or investigation that pulmonary function testing would be necessary.  She was quite adamant that she did not believe her symptoms were related to asthma and that was not interested in coming in for PFTs.  We also discussed that pulmonary work up and evaluation for recurrent pulmonary infections would require investigation for underlying lung diseases and that would also require PFTs.  We reviewed that there was no evidence of pneumonia or overt structural lung disease on any prior imaging or her most recent CT chest from November 2022 which was reassuring.  The linear atelectasis seen on this scan was minimal and likely also visualized on the CXR from 2021 reviewed above.  We discussed that linear atelectasis could also be representative of hypoventilation due to phase of the respiratory cycle during the CT.  Either way findings are minimal, do not require further investigation and are not related to her current symptoms.  2. Pulmonary nodule.  A few, sub 6 mm pulmonary nodules were reported on CT chest from February 2021.  A 5 mm RML nodule was seen on this most recent scan and likely is represents the same nodule seen in the RML as before.  I let her know that to be definitive I could request the outside images for review and ask the radiologist for comparison.  Regardless though, she remains a low risk patient (never smoker, no personal history of malignancy) and updated Fleischner criteria to not recommend any routine follow up for nodules < 6 mm in this population.  Ms. Gan asked me to write a medical excuse letter for the cruise company supporting her cancelling her cruise and requesting a refund based on her pulmonary conditions.  I explained to her that the only confirmed pulmonary diagnosis I had for her was pulmonary nodules which do not require further follow up.  I suggested it would be more appropriate for a PCP to provide this documentation as they could  provide the most comprehensive picture of her medical diagnoses.  I explained that I would be happy to work her up further for her respiratory symptoms and provide any medical documentation appropriate for her pulmonary diagnoses, but that next steps would require additional testing including PFTs to help ascertain the presence of lung disease. She continued to decline to come in for PFTs or other testing.  Barring additional data, I let her know that I had no active pulmonary diagnoses for her upon which I could reasonably or justifiable provide the letter she was requesting. At this point Ms. Gan terminated the video visit with me stating she could see we were getting nowhere I was not interested in helping her.  Due to the abrupt termination of this visit, there was no opportunity for further discussion of obtaining outside CT imaging for pulmonary nodule comparison, etc although I do see in Care Everywhere she has been referred to pulmonary nodule clinic in the [facility] system.  Return to clinic as needed.   Angelica Chinchilla MD  Pulmonary and Critical Care Medicine    The above note was dictated using voice recognition software and may include typographical errors. Please contact the author for any clarifications.    I spent 60 minutes on the date of encounter doing chart review, review of outside records, review of test results, conducting the patient visit, completing documentation and further activities as noted above.                                        Again, thank you for allowing me to participate in the care of your patient.        Sincerely,        Zulma Chinchilla MD

## 2023-12-17 ENCOUNTER — HEALTH MAINTENANCE LETTER (OUTPATIENT)
Age: 73
End: 2023-12-17

## 2024-05-05 ENCOUNTER — HEALTH MAINTENANCE LETTER (OUTPATIENT)
Age: 74
End: 2024-05-05

## 2024-05-28 ENCOUNTER — APPOINTMENT (OUTPATIENT)
Dept: URBAN - METROPOLITAN AREA CLINIC 259 | Age: 74
Setting detail: DERMATOLOGY
End: 2024-05-30

## 2024-05-28 VITALS — HEIGHT: 64 IN | WEIGHT: 160 LBS

## 2024-05-28 DIAGNOSIS — R21 RASH AND OTHER NONSPECIFIC SKIN ERUPTION: ICD-10-CM

## 2024-05-28 DIAGNOSIS — D18.0 HEMANGIOMA: ICD-10-CM

## 2024-05-28 DIAGNOSIS — L81.4 OTHER MELANIN HYPERPIGMENTATION: ICD-10-CM

## 2024-05-28 DIAGNOSIS — D49.2 NEOPLASM OF UNSPECIFIED BEHAVIOR OF BONE, SOFT TISSUE, AND SKIN: ICD-10-CM

## 2024-05-28 DIAGNOSIS — L82.1 OTHER SEBORRHEIC KERATOSIS: ICD-10-CM

## 2024-05-28 DIAGNOSIS — L57.8 OTHER SKIN CHANGES DUE TO CHRONIC EXPOSURE TO NONIONIZING RADIATION: ICD-10-CM

## 2024-05-28 DIAGNOSIS — Z71.89 OTHER SPECIFIED COUNSELING: ICD-10-CM

## 2024-05-28 DIAGNOSIS — L57.0 ACTINIC KERATOSIS: ICD-10-CM

## 2024-05-28 DIAGNOSIS — D22 MELANOCYTIC NEVI: ICD-10-CM

## 2024-05-28 PROBLEM — D23.72 OTHER BENIGN NEOPLASM OF SKIN OF LEFT LOWER LIMB, INCLUDING HIP: Status: ACTIVE | Noted: 2024-05-28

## 2024-05-28 PROBLEM — D18.01 HEMANGIOMA OF SKIN AND SUBCUTANEOUS TISSUE: Status: ACTIVE | Noted: 2024-05-28

## 2024-05-28 PROBLEM — D22.5 MELANOCYTIC NEVI OF TRUNK: Status: ACTIVE | Noted: 2024-05-28

## 2024-05-28 PROCEDURE — OTHER MIPS QUALITY: OTHER

## 2024-05-28 PROCEDURE — OTHER PRESCRIPTION MEDICATION MANAGEMENT: OTHER

## 2024-05-28 PROCEDURE — OTHER COUNSELING: OTHER

## 2024-05-28 PROCEDURE — OTHER BIOPSY BY SHAVE METHOD: OTHER

## 2024-05-28 PROCEDURE — OTHER LIQUID NITROGEN: OTHER

## 2024-05-28 PROCEDURE — OTHER PRESCRIPTION: OTHER

## 2024-05-28 PROCEDURE — 99203 OFFICE O/P NEW LOW 30 MIN: CPT | Mod: 25

## 2024-05-28 PROCEDURE — 11102 TANGNTL BX SKIN SINGLE LES: CPT

## 2024-05-28 PROCEDURE — 17000 DESTRUCT PREMALG LESION: CPT | Mod: 59

## 2024-05-28 RX ORDER — MUPIROCIN 20 MG/G
OINTMENT TOPICAL BID
Qty: 22 | Refills: 1 | Status: ERX | COMMUNITY
Start: 2024-05-28

## 2024-05-28 ASSESSMENT — LOCATION ZONE DERM
LOCATION ZONE: LEG
LOCATION ZONE: TRUNK
LOCATION ZONE: SCALP

## 2024-05-28 ASSESSMENT — LOCATION DETAILED DESCRIPTION DERM
LOCATION DETAILED: LEFT MEDIAL SUPERIOR CHEST
LOCATION DETAILED: LEFT SUPERIOR MEDIAL UPPER BACK
LOCATION DETAILED: RIGHT SUPERIOR UPPER BACK
LOCATION DETAILED: LEFT CENTRAL FRONTAL SCALP
LOCATION DETAILED: LEFT MEDIAL UPPER BACK
LOCATION DETAILED: RIGHT DISTAL POSTERIOR THIGH
LOCATION DETAILED: RIGHT CENTRAL POSTAURICULAR SKIN

## 2024-05-28 ASSESSMENT — LOCATION SIMPLE DESCRIPTION DERM
LOCATION SIMPLE: RIGHT POSTERIOR THIGH
LOCATION SIMPLE: LEFT SCALP
LOCATION SIMPLE: SCALP
LOCATION SIMPLE: LEFT UPPER BACK
LOCATION SIMPLE: RIGHT UPPER BACK
LOCATION SIMPLE: CHEST

## 2024-05-28 NOTE — PROCEDURE: PRESCRIPTION MEDICATION MANAGEMENT
Initiate Treatment: mupirocin 2 % topical ointment BID
Render In Strict Bullet Format?: No
Plan: **Pt advised to use Vaseline or aquaphor over the affected areas if needed. RTC in 4 weeks for a recheck
Detail Level: Zone

## 2024-05-28 NOTE — HPI: FULL BODY SKIN EXAMINATION
What Type Of Note Output Would You Prefer (Optional)?: Standard Output
What Is The Reason For Today's Visit?: Full Body Skin Examination
What Is The Reason For Today's Visit? (Being Monitored For X): the development of new lesions
Additional History: Patient reports red, itchy \"blisters\" on the chest, scalp, and back of the ear that she has noticed 5-6 days ago. Patient would like these spots evaluated today. Mentions a history of \"chemical exposure\" and states the lesions/rash developed after recent exposure to \"chemicals.\"

## 2024-05-28 NOTE — PROCEDURE: LIQUID NITROGEN
Show Applicator Variable?: Yes
Consent: The patient's consent was obtained including but not limited to risks of crusting, scabbing, blistering, scarring, darker or lighter pigmentary change, recurrence, incomplete removal and infection.
Application Tool (Optional): Liquid Nitrogen Sprayer
Render Note In Bullet Format When Appropriate: No
Duration Of Freeze Thaw-Cycle (Seconds): 0
Number Of Freeze-Thaw Cycles: 2 freeze-thaw cycles
Post-Care Instructions: I reviewed with the patient in detail post-care instructions. Patient is to wear sunprotection, and avoid picking at any of the treated lesions. Pt may apply Vaseline to crusted or scabbing areas.
Detail Level: Detailed

## 2024-05-28 NOTE — PROCEDURE: BIOPSY BY SHAVE METHOD
Detail Level: Detailed
Depth Of Biopsy: dermis
Was A Bandage Applied: Yes
X Size Of Lesion In Cm: 0
Biopsy Type: H and E
Biopsy Method: Dermablade
Anesthesia Type: 1% lidocaine with epinephrine and a 1:10 solution of 8.4% sodium bicarbonate
Anesthesia Volume In Cc: 0.3
Hemostasis: Aluminum Chloride
Wound Care: Petrolatum
Dressing: bandage
Destruction After The Procedure: No
Type Of Destruction Used: Curettage
Curettage Text: The wound bed was treated with curettage after the biopsy was performed.
Cryotherapy Text: The wound bed was treated with cryotherapy after the biopsy was performed.
Electrodesiccation Text: The wound bed was treated with electrodesiccation after the biopsy was performed.
Electrodesiccation And Curettage Text: The wound bed was treated with electrodesiccation and curettage after the biopsy was performed.
Silver Nitrate Text: The wound bed was treated with silver nitrate after the biopsy was performed.
Lab: -[id number]
Consent: Written consent was obtained and risks were reviewed including but not limited to scarring, infection, bleeding, scabbing, incomplete removal, nerve damage and allergy to anesthesia.
Post-Care Instructions: I reviewed with the patient in detail post-care instructions. Patient is to keep the biopsy site dry overnight, and then apply bacitracin twice daily until healed. Patient may apply hydrogen peroxide soaks to remove any crusting.
Notification Instructions: Patient will be notified of biopsy results. However, patient instructed to call the office if not contacted within 2 weeks.
Billing Type: Third-Party Bill
Information: Selecting Yes will display possible errors in your note based on the variables you have selected. This validation is only offered as a suggestion for you. PLEASE NOTE THAT THE VALIDATION TEXT WILL BE REMOVED WHEN YOU FINALIZE YOUR NOTE. IF YOU WANT TO FAX A PRELIMINARY NOTE YOU WILL NEED TO TOGGLE THIS TO 'NO' IF YOU DO NOT WANT IT IN YOUR FAXED NOTE.

## 2024-06-24 ENCOUNTER — APPOINTMENT (OUTPATIENT)
Dept: URBAN - METROPOLITAN AREA CLINIC 259 | Age: 74
Setting detail: DERMATOLOGY
End: 2024-07-01

## 2024-06-24 PROBLEM — D03.71 MELANOMA IN SITU OF RIGHT LOWER LIMB, INCLUDING HIP: Status: ACTIVE | Noted: 2024-06-24

## 2024-06-24 PROCEDURE — 12032 INTMD RPR S/A/T/EXT 2.6-7.5: CPT

## 2024-06-24 PROCEDURE — OTHER COUNSELING: OTHER

## 2024-06-24 PROCEDURE — 11602 EXC TR-EXT MAL+MARG 1.1-2 CM: CPT

## 2024-06-24 PROCEDURE — OTHER EXCISION: OTHER

## 2024-06-24 PROCEDURE — OTHER MIPS QUALITY: OTHER

## 2024-06-24 NOTE — PROCEDURE: MIPS QUALITY
Quality 143: Oncology: Medical And Radiation- Pain Intensity Quantified: Pain severity quantified, no pain present
Quality 130: Documentation Of Current Medications In The Medical Record: Current Medications Documented
Quality 137: Melanoma: Continuity Of Care - Recall System: Patient information entered into a recall system that includes: target date for the next exam specified AND a process to follow up with patients regarding missed or unscheduled appointments
Quality 431: Preventive Care And Screening: Unhealthy Alcohol Use - Screening: Patient not identified as an unhealthy alcohol user when screened for unhealthy alcohol use using a systematic screening method
Quality 397: Melanoma: Reporting: Pathology report includes the pT Category, thickness, ulceration and mitotic rate, peripheral and deep margin status and presence or absence of microsatellitosis for invasive tumors.
Quality 226: Preventive Care And Screening: Tobacco Use: Screening And Cessation Intervention: Patient screened for tobacco use and is an ex/non-smoker
Detail Level: Detailed

## 2024-06-24 NOTE — PROCEDURE: EXCISION
Biopsy Photograph Reviewed: Yes
Pathology Comment (Optional): Please check margins.
Size Of Lesion In Cm: 0.7
X Size Of Lesion In Cm (Optional): 0.6
Size Of Margin In Cm: 0.5
Anesthesia Volume In Cc: 6
Was An Eye Clamp Used?: No
Eye Clamp Note Details: An eye clamp was used during the procedure.
Excision Method: Elliptical
Saucerization Depth: dermis and superficial adipose tissue
Repair Type: Intermediate
Intermediate / Complex Repair - Final Wound Length In Cm: 3.5
Suturegard Retention Suture: 2-0 Nylon
Retention Suture Bite Size: 3 mm
Length To Time In Minutes Device Was In Place: 10
Number Of Hemigard Strips Per Side: 1
Undermining Type: Entire Wound
Debridement Text: The wound edges were debrided prior to proceeding with the closure to facilitate wound healing.
Helical Rim Text: The closure involved the helical rim.
Vermilion Border Text: The closure involved the vermilion border.
Nostril Rim Text: The closure involved the nostril rim.
Retention Suture Text: Retention sutures were placed to support the closure and prevent dehiscence.
Primary Defect Length (In Cm): 0
Suture Removal: 14 days
Lab: [id number]
Graft Donor Site Bandage (Optional-Leave Blank If You Don't Want In Note): Steri-strips and a pressure bandage were applied to the donor site.
Epidermal Closure Graft Donor Site (Optional): simple interrupted
Billing Type: Third-Party Bill
Excision Depth: adipose tissue
Scalpel Size: 15 blade
Anesthesia Type: 1% lidocaine with epinephrine and a 1:10 solution of 8.4% sodium bicarbonate
Hemostasis: Electrocautery
Estimated Blood Loss (Cc): minimal
Detail Level: Detailed
Repair Depth: use same depth as excision depth
Deep Sutures: 4-0 PGLC
Epidermal Sutures: 5-0 Polypropylene
Epidermal Closure: running
Wound Care: Petrolatum
Dressing: pressure dressing with telfa
Suturegard Intro: Intraoperative tissue expansion was performed, utilizing the SUTUREGARD device, in order to reduce wound tension.
Suturegard Body: The suture ends were repeatedly re-tightened and re-clamped to achieve the desired tissue expansion.
Hemigard Intro: Due to skin fragility and wound tension, it was decided to use HEMIGARD adhesive retention suture devices to permit a linear closure. The skin was cleaned and dried for a 6cm distance away from the wound. Excessive hair, if present, was removed to allow for adhesion.
Hemigard Postcare Instructions: The HEMIGARD strips are to remain completely dry for at least 5-7 days.
Positioning (Leave Blank If You Do Not Want): The patient was placed in a comfortable position exposing the surgical site.
Pre-Excision Curettage Text (Leave Blank If You Do Not Want): Prior to drawing the surgical margin the visible lesion was removed with curettage to clearly define the lesion size.
Complex Repair Preamble Text (Leave Blank If You Do Not Want): Extensive wide undermining was performed.
Intermediate Repair Preamble Text (Leave Blank If You Do Not Want): Undermining was performed with blunt dissection.
Curvilinear Excision Additional Text (Leave Blank If You Do Not Want): The margin was drawn around the clinically apparent lesion.  A curvilinear shape was then drawn on the skin incorporating the lesion and margins.  Incisions were then made along these lines to the appropriate tissue plane and the lesion was extirpated.
Fusiform Excision Additional Text (Leave Blank If You Do Not Want): The margin was drawn around the clinically apparent lesion.  A fusiform shape was then drawn on the skin incorporating the lesion and margins.  Incisions were then made along these lines to the appropriate tissue plane and the lesion was extirpated.
Elliptical Excision Additional Text (Leave Blank If You Do Not Want): The margin was drawn around the clinically apparent lesion.  An elliptical shape was then drawn on the skin incorporating the lesion and margins.  Incisions were then made along these lines to the appropriate tissue plane and the lesion was extirpated.
Saucerization Excision Additional Text (Leave Blank If You Do Not Want): The margin was drawn around the clinically apparent lesion.  Incisions were then made along these lines, in a tangential fashion, to the appropriate tissue plane and the lesion was extirpated.
Slit Excision Additional Text (Leave Blank If You Do Not Want): A linear line was drawn on the skin overlying the lesion. An incision was made slowly until the lesion was visualized.  Once visualized, the lesion was removed with blunt dissection.
Excisional Biopsy Additional Text (Leave Blank If You Do Not Want): The margin was drawn around the clinically apparent lesion. An elliptical shape was then drawn on the skin incorporating the lesion and margins.  Incisions were then made along these lines to the appropriate tissue plane and the lesion was extirpated.
Perilesional Excision Additional Text (Leave Blank If You Do Not Want): The margin was drawn around the clinically apparent lesion. Incisions were then made along these lines to the appropriate tissue plane and the lesion was extirpated.
Repair Performed By Another Provider Text (Leave Blank If You Do Not Want): After the tissue was excised the defect was repaired by another provider.
No Repair - Repaired With Adjacent Surgical Defect Text (Leave Blank If You Do Not Want): After the excision the defect was repaired concurrently with another surgical defect which was in close approximation.
Adjacent Tissue Transfer Text: The defect edges were debeveled with a #15 scalpel blade. Given the location of the defect and the proximity to free margins an adjacent tissue transfer was deemed most appropriate. Using a sterile surgical marker, an appropriate flap was drawn incorporating the defect and placing the expected incisions within the relaxed skin tension lines where possible. The area thus outlined was incised deep to adipose tissue with a #15 scalpel blade. The skin margins were undermined to an appropriate distance in all directions utilizing iris scissors and carried over to close the primary defect.
Advancement Flap (Single) Text: The defect edges were debeveled with a #15 scalpel blade. Given the location of the defect and the proximity to free margins a single advancement flap was deemed most appropriate. Using a sterile surgical marker, an appropriate advancement flap was drawn incorporating the defect and placing the expected incisions within the relaxed skin tension lines where possible. The area thus outlined was incised deep to adipose tissue with a #15 scalpel blade. The skin margins were undermined to an appropriate distance in all directions utilizing iris scissors. Following this, the designed flap was advanced and carried over into the primary defect and sutured into place.
Advancement Flap (Double) Text: The defect edges were debeveled with a #15 scalpel blade. Given the location of the defect and the proximity to free margins a double advancement flap was deemed most appropriate. Using a sterile surgical marker, the appropriate advancement flaps were drawn incorporating the defect and placing the expected incisions within the relaxed skin tension lines where possible. The area thus outlined was incised deep to adipose tissue with a #15 scalpel blade. The skin margins were undermined to an appropriate distance in all directions utilizing iris scissors. Following this, the designed flaps were advanced and carried over into the primary defect and sutured into place.
Burow's Advancement Flap Text: The defect edges were debeveled with a #15 scalpel blade. Given the location of the defect and the proximity to free margins a Burow's advancement flap was deemed most appropriate. Using a sterile surgical marker, the appropriate advancement flap was drawn incorporating the defect and placing the expected incisions within the relaxed skin tension lines where possible. The area thus outlined was incised deep to adipose tissue with a #15 scalpel blade. The skin margins were undermined to an appropriate distance in all directions utilizing iris scissors. Following this, the designed flap was advanced and carried over into the primary defect and sutured into place.
Chonodrocutaneous Helical Advancement Flap Text: The defect edges were debeveled with a #15 scalpel blade. Given the location of the defect and the proximity to free margins a chondrocutaneous helical advancement flap was deemed most appropriate. Using a sterile surgical marker, the appropriate advancement flap was drawn incorporating the defect and placing the expected incisions within the relaxed skin tension lines where possible. The area thus outlined was incised deep to adipose tissue with a #15 scalpel blade. The skin margins were undermined to an appropriate distance in all directions utilizing iris scissors. Following this, the designed flap was advanced and carried over into the primary defect and sutured into place.
Crescentic Advancement Flap Text: The defect edges were debeveled with a #15 scalpel blade. Given the location of the defect and the proximity to free margins a crescentic advancement flap was deemed most appropriate. Using a sterile surgical marker, the appropriate advancement flap was drawn incorporating the defect and placing the expected incisions within the relaxed skin tension lines where possible. The area thus outlined was incised deep to adipose tissue with a #15 scalpel blade. The skin margins were undermined to an appropriate distance in all directions utilizing iris scissors. Following this, the designed flap was advanced and carried over into the primary defect and sutured into place.
A-T Advancement Flap Text: The defect edges were debeveled with a #15 scalpel blade. Given the location of the defect, shape of the defect and the proximity to free margins an A-T advancement flap was deemed most appropriate. Using a sterile surgical marker, an appropriate advancement flap was drawn incorporating the defect and placing the expected incisions within the relaxed skin tension lines where possible. The area thus outlined was incised deep to adipose tissue with a #15 scalpel blade. The skin margins were undermined to an appropriate distance in all directions utilizing iris scissors. Following this, the designed flap was advanced and carried over into the primary defect and sutured into place.
O-T Advancement Flap Text: The defect edges were debeveled with a #15 scalpel blade. Given the location of the defect, shape of the defect and the proximity to free margins an O-T advancement flap was deemed most appropriate. Using a sterile surgical marker, an appropriate advancement flap was drawn incorporating the defect and placing the expected incisions within the relaxed skin tension lines where possible. The area thus outlined was incised deep to adipose tissue with a #15 scalpel blade. The skin margins were undermined to an appropriate distance in all directions utilizing iris scissors. Following this, the designed flap was advanced and carried over into the primary defect and sutured into place.
O-L Flap Text: The defect edges were debeveled with a #15 scalpel blade. Given the location of the defect, shape of the defect and the proximity to free margins an O-L flap was deemed most appropriate. Using a sterile surgical marker, an appropriate advancement flap was drawn incorporating the defect and placing the expected incisions within the relaxed skin tension lines where possible. The area thus outlined was incised deep to adipose tissue with a #15 scalpel blade. The skin margins were undermined to an appropriate distance in all directions utilizing iris scissors. Following this, the designed flap was advanced and carried over into the primary defect and sutured into place.
O-Z Flap Text: The defect edges were debeveled with a #15 scalpel blade. Given the location of the defect, shape of the defect and the proximity to free margins an O-Z flap was deemed most appropriate. Using a sterile surgical marker, an appropriate transposition flap was drawn incorporating the defect and placing the expected incisions within the relaxed skin tension lines where possible. The area thus outlined was incised deep to adipose tissue with a #15 scalpel blade. The skin margins were undermined to an appropriate distance in all directions utilizing iris scissors. Following this, the designed flap was carried over into the primary defect and sutured into place.
Double O-Z Flap Text: The defect edges were debeveled with a #15 scalpel blade. Given the location of the defect, shape of the defect and the proximity to free margins a Double O-Z flap was deemed most appropriate. Using a sterile surgical marker, an appropriate transposition flap was drawn incorporating the defect and placing the expected incisions within the relaxed skin tension lines where possible. The area thus outlined was incised deep to adipose tissue with a #15 scalpel blade. The skin margins were undermined to an appropriate distance in all directions utilizing iris scissors. Following this, the designed flap was carried over into the primary defect and sutured into place.
V-Y Flap Text: The defect edges were debeveled with a #15 scalpel blade. Given the location of the defect, shape of the defect and the proximity to free margins a V-Y flap was deemed most appropriate. Using a sterile surgical marker, an appropriate advancement flap was drawn incorporating the defect and placing the expected incisions within the relaxed skin tension lines where possible. The area thus outlined was incised deep to adipose tissue with a #15 scalpel blade. The skin margins were undermined to an appropriate distance in all directions utilizing iris scissors. Following this, the designed flap was advanced and carried over into the primary defect and sutured into place.
Advancement-Rotation Flap Text: The defect edges were debeveled with a #15 scalpel blade. Given the location of the defect, shape of the defect and the proximity to free margins an advancement-rotation flap was deemed most appropriate. Using a sterile surgical marker, an appropriate flap was drawn incorporating the defect and placing the expected incisions within the relaxed skin tension lines where possible. The area thus outlined was incised deep to adipose tissue with a #15 scalpel blade. The skin margins were undermined to an appropriate distance in all directions utilizing iris scissors. Following this, the designed flap was carried over into the primary defect and sutured into place.
Mercedes Flap Text: The defect edges were debeveled with a #15 scalpel blade. Given the location of the defect, shape of the defect and the proximity to free margins a Mercedes flap was deemed most appropriate. Using a sterile surgical marker, an appropriate advancement flap was drawn incorporating the defect and placing the expected incisions within the relaxed skin tension lines where possible. The area thus outlined was incised deep to adipose tissue with a #15 scalpel blade. The skin margins were undermined to an appropriate distance in all directions utilizing iris scissors. Following this, the designed flap was advanced and carried over into the primary defect and sutured into place.
Modified Advancement Flap Text: The defect edges were debeveled with a #15 scalpel blade. Given the location of the defect, shape of the defect and the proximity to free margins a modified advancement flap was deemed most appropriate. Using a sterile surgical marker, an appropriate advancement flap was drawn incorporating the defect and placing the expected incisions within the relaxed skin tension lines where possible. The area thus outlined was incised deep to adipose tissue with a #15 scalpel blade. The skin margins were undermined to an appropriate distance in all directions utilizing iris scissors. Following this, the designed flap was advanced and carried over into the primary defect and sutured into place.
Mucosal Advancement Flap Text: Given the location of the defect, shape of the defect and the proximity to free margins a mucosal advancement flap was deemed most appropriate. Incisions were made with a 15 blade scalpel in the appropriate fashion along the cutaneous vermilion border and the mucosal lip. The remaining actinically damaged mucosal tissue was excised.  The mucosal advancement flap was then elevated to the gingival sulcus with care taken to preserve the neurovascular structures and advanced into the primary defect. Care was taken to ensure that precise realignment of the vermilion border was achieved.
Peng Advancement Flap Text: The defect edges were debeveled with a #15 scalpel blade. Given the location of the defect, shape of the defect and the proximity to free margins a Peng advancement flap was deemed most appropriate. Using a sterile surgical marker, an appropriate advancement flap was drawn incorporating the defect and placing the expected incisions within the relaxed skin tension lines where possible. The area thus outlined was incised deep to adipose tissue with a #15 scalpel blade. The skin margins were undermined to an appropriate distance in all directions utilizing iris scissors. Following this, the designed flap was advanced and carried over into the primary defect and sutured into place.
Hatchet Flap Text: The defect edges were debeveled with a #15 scalpel blade. Given the location of the defect, shape of the defect and the proximity to free margins a hatchet flap was deemed most appropriate. Using a sterile surgical marker, an appropriate hatchet flap was drawn incorporating the defect and placing the expected incisions within the relaxed skin tension lines where possible. The area thus outlined was incised deep to adipose tissue with a #15 scalpel blade. The skin margins were undermined to an appropriate distance in all directions utilizing iris scissors. Following this, the designed flap was carried over into the primary defect and sutured into place.
Rotation Flap Text: The defect edges were debeveled with a #15 scalpel blade. Given the location of the defect, shape of the defect and the proximity to free margins a rotation flap was deemed most appropriate. Using a sterile surgical marker, an appropriate rotation flap was drawn incorporating the defect and placing the expected incisions within the relaxed skin tension lines where possible. The area thus outlined was incised deep to adipose tissue with a #15 scalpel blade. The skin margins were undermined to an appropriate distance in all directions utilizing iris scissors. Following this, the designed flap was carried over into the primary defect and sutured into place.
Bilateral Rotation Flap Text: The defect edges were debeveled with a #15 scalpel blade. Given the location of the defect, shape of the defect and the proximity to free margins a bilateral rotation flap was deemed most appropriate. Using a sterile surgical marker, an appropriate rotation flap was drawn incorporating the defect and placing the expected incisions within the relaxed skin tension lines where possible. The area thus outlined was incised deep to adipose tissue with a #15 scalpel blade. The skin margins were undermined to an appropriate distance in all directions utilizing iris scissors. Following this, the designed flap was carried over into the primary defect and sutured into place.
Spiral Flap Text: The defect edges were debeveled with a #15 scalpel blade. Given the location of the defect, shape of the defect and the proximity to free margins a spiral flap was deemed most appropriate. Using a sterile surgical marker, an appropriate rotation flap was drawn incorporating the defect and placing the expected incisions within the relaxed skin tension lines where possible. The area thus outlined was incised deep to adipose tissue with a #15 scalpel blade. The skin margins were undermined to an appropriate distance in all directions utilizing iris scissors. Following this, the designed flap was carried over into the primary defect and sutured into place.
Staged Advancement Flap Text: The defect edges were debeveled with a #15 scalpel blade. Given the location of the defect, shape of the defect and the proximity to free margins a staged advancement flap was deemed most appropriate. Using a sterile surgical marker, an appropriate advancement flap was drawn incorporating the defect and placing the expected incisions within the relaxed skin tension lines where possible. The area thus outlined was incised deep to adipose tissue with a #15 scalpel blade. The skin margins were undermined to an appropriate distance in all directions utilizing iris scissors. Following this, the designed flap was carried over into the primary defect and sutured into place.
Star Wedge Flap Text: The defect edges were debeveled with a #15 scalpel blade. Given the location of the defect, shape of the defect and the proximity to free margins a star wedge flap was deemed most appropriate. Using a sterile surgical marker, an appropriate rotation flap was drawn incorporating the defect and placing the expected incisions within the relaxed skin tension lines where possible. The area thus outlined was incised deep to adipose tissue with a #15 scalpel blade. The skin margins were undermined to an appropriate distance in all directions utilizing iris scissors. Following this, the designed flap was carried over into the primary defect and sutured into place.
Transposition Flap Text: The defect edges were debeveled with a #15 scalpel blade. Given the location of the defect and the proximity to free margins a transposition flap was deemed most appropriate. Using a sterile surgical marker, an appropriate transposition flap was drawn incorporating the defect. The area thus outlined was incised deep to adipose tissue with a #15 scalpel blade. The skin margins were undermined to an appropriate distance in all directions utilizing iris scissors. Following this, the designed flap was carried over into the primary defect and sutured into place.
Muscle Hinge Flap Text: The defect edges were debeveled with a #15 scalpel blade.  Given the size, depth and location of the defect and the proximity to free margins a muscle hinge flap was deemed most appropriate. Using a sterile surgical marker, an appropriate hinge flap was drawn incorporating the defect. The area thus outlined was incised with a #15 scalpel blade. The skin margins were undermined to an appropriate distance in all directions utilizing iris scissors. Following this, the designed flap was carried into the primary defect and sutured into place.
Mustarde Flap Text: The defect edges were debeveled with a #15 scalpel blade.  Given the size, depth and location of the defect and the proximity to free margins a Mustarde flap was deemed most appropriate. Using a sterile surgical marker, an appropriate flap was drawn incorporating the defect. The area thus outlined was incised with a #15 scalpel blade. The skin margins were undermined to an appropriate distance in all directions utilizing iris scissors. Following this, the designed flap was carried into the primary defect and sutured into place.
Nasal Turnover Hinge Flap Text: The defect edges were debeveled with a #15 scalpel blade.  Given the size, depth, location of the defect and the defect being full thickness a nasal turnover hinge flap was deemed most appropriate. Using a sterile surgical marker, an appropriate hinge flap was drawn incorporating the defect. The area thus outlined was incised with a #15 scalpel blade. The flap was designed to recreate the nasal mucosal lining and the alar rim. The skin margins were undermined to an appropriate distance in all directions utilizing iris scissors. Following this, the designed flap was carried over into the primary defect and sutured into place
Nasalis-Muscle-Based Myocutaneous Island Pedicle Flap Text: Using a #15 blade, an incision was made around the donor flap to the level of the nasalis muscle. Wide lateral undermining was then performed in both the subcutaneous plane above the nasalis muscle, and in a submuscular plane just above periosteum. This allowed the formation of a free nasalis muscle axial pedicle (based on the angular artery) which was still attached to the actual cutaneous flap, increasing its mobility and vascular viability. Hemostasis was obtained with pinpoint electrocoagulation. The flap was mobilized into position and the pivotal anchor points positioned and stabilized with buried interrupted sutures. Subcutaneous and dermal tissues were closed in a multilayered fashion with sutures. Tissue redundancies were excised, and the epidermal edges were apposed without significant tension and sutured with sutures.
Nasalis Myocutaneous Flap Text: Using a #15 blade, an incision was made around the donor flap to the level of the nasalis muscle. Wide lateral undermining was then performed in both the subcutaneous plane above the nasalis muscle, and in a submuscular plane just above periosteum. This allowed the formation of a free nasalis muscle axial pedicle which was still attached to the actual cutaneous flap, increasing its mobility and vascular viability. Hemostasis was obtained with pinpoint electrocoagulation. The flap was mobilized into position and the pivotal anchor points positioned and stabilized with buried interrupted sutures. Subcutaneous and dermal tissues were closed in a multilayered fashion with sutures. Tissue redundancies were excised, and the epidermal edges were apposed without significant tension and sutured with sutures.
Nasolabial Transposition Flap Text: The defect edges were debeveled with a #15 scalpel blade.  Given the size, depth and location of the defect and the proximity to free margins a nasolabial transposition flap was deemed most appropriate. Using a sterile surgical marker, an appropriate flap was drawn incorporating the defect. The area thus outlined was incised with a #15 scalpel blade. The skin margins were undermined to an appropriate distance in all directions utilizing iris scissors. Following this, the designed flap was carried into the primary defect and sutured into place.
Orbicularis Oris Muscle Flap Text: The defect edges were debeveled with a #15 scalpel blade.  Given that the defect affected the competency of the oral sphincter an orbicularis oris muscle flap was deemed most appropriate to restore this competency and normal muscle function.  Using a sterile surgical marker, an appropriate flap was drawn incorporating the defect. The area thus outlined was incised with a #15 scalpel blade. Following this, the designed flap was carried over into the primary defect and sutured into place.
Melolabial Transposition Flap Text: The defect edges were debeveled with a #15 scalpel blade. Given the location of the defect and the proximity to free margins a melolabial flap was deemed most appropriate. Using a sterile surgical marker, an appropriate melolabial transposition flap was drawn incorporating the defect. The area thus outlined was incised deep to adipose tissue with a #15 scalpel blade. The skin margins were undermined to an appropriate distance in all directions utilizing iris scissors. Following this, the designed flap was carried over into the primary defect and sutured into place.
Rectangular Flap Text: The defect edges were debeveled with a #15 scalpel blade. Given the location of the defect and the proximity to free margins a rectangular flap was deemed most appropriate. Using a sterile surgical marker, an appropriate rectangular flap was drawn incorporating the defect. The area thus outlined was incised deep to adipose tissue with a #15 scalpel blade. The skin margins were undermined to an appropriate distance in all directions utilizing iris scissors. Following this, the designed flap was carried over into the primary defect and sutured into place.
Rhombic Flap Text: The defect edges were debeveled with a #15 scalpel blade. Given the location of the defect and the proximity to free margins a rhombic flap was deemed most appropriate. Using a sterile surgical marker, an appropriate rhombic flap was drawn incorporating the defect. The area thus outlined was incised deep to adipose tissue with a #15 scalpel blade. The skin margins were undermined to an appropriate distance in all directions utilizing iris scissors. Following this, the designed flap was carried over into the primary defect and sutured into place.
Rhomboid Transposition Flap Text: The defect edges were debeveled with a #15 scalpel blade. Given the location of the defect and the proximity to free margins a rhomboid transposition flap was deemed most appropriate. Using a sterile surgical marker, an appropriate rhomboid flap was drawn incorporating the defect. The area thus outlined was incised deep to adipose tissue with a #15 scalpel blade. The skin margins were undermined to an appropriate distance in all directions utilizing iris scissors. Following this, the designed flap was carried over into the primary defect and sutured into place.
Bi-Rhombic Flap Text: The defect edges were debeveled with a #15 scalpel blade. Given the location of the defect and the proximity to free margins a bi-rhombic flap was deemed most appropriate. Using a sterile surgical marker, an appropriate rhombic flap was drawn incorporating the defect. The area thus outlined was incised deep to adipose tissue with a #15 scalpel blade. The skin margins were undermined to an appropriate distance in all directions utilizing iris scissors. Following this, the designed flap was carried over into the primary defect and sutured into place.
Helical Rim Advancement Flap Text: The defect edges were debeveled with a #15 blade scalpel.  Given the location of the defect and the proximity to free margins (helical rim) a double helical rim advancement flap was deemed most appropriate. Using a sterile surgical marker, the appropriate advancement flaps were drawn incorporating the defect and placing the expected incisions between the helical rim and antihelix where possible.  The area thus outlined was incised through and through with a #15 scalpel blade.  With a skin hook and iris scissors, the flaps were gently and sharply undermined and freed up. Folllowing this, the designed flaps were carried over into the primary defect and sutured into place.
Bilateral Helical Rim Advancement Flap Text: The defect edges were debeveled with a #15 blade scalpel.  Given the location of the defect and the proximity to free margins (helical rim) a bilateral helical rim advancement flap was deemed most appropriate. Using a sterile surgical marker, the appropriate advancement flaps were drawn incorporating the defect and placing the expected incisions between the helical rim and antihelix where possible.  The area thus outlined was incised through and through with a #15 scalpel blade.  With a skin hook and iris scissors, the flaps were gently and sharply undermined and freed up. Following this, the designed flaps were placed into the primary defect and sutured into place.
Ear Star Wedge Flap Text: The defect edges were debeveled with a #15 blade scalpel.  Given the location of the defect and the proximity to free margins (helical rim) an ear star wedge flap was deemed most appropriate. Using a sterile surgical marker, the appropriate flap was drawn incorporating the defect and placing the expected incisions between the helical rim and antihelix where possible.  The area thus outlined was incised through and through with a #15 scalpel blade. Following this, the designed flap was carried over into the primary defect and sutured into place.
Banner Transposition Flap Text: The defect edges were debeveled with a #15 scalpel blade. Given the location of the defect and the proximity to free margins a Banner transposition flap was deemed most appropriate. Using a sterile surgical marker, an appropriate flap was drawn around the defect. The area thus outlined was incised deep to adipose tissue with a #15 scalpel blade. The skin margins were undermined to an appropriate distance in all directions utilizing iris scissors. Following this, the designed flap was carried into the primary defect and sutured into place.
Bilobed Flap Text: The defect edges were debeveled with a #15 scalpel blade. Given the location of the defect and the proximity to free margins a bilobe flap was deemed most appropriate. Using a sterile surgical marker, an appropriate bilobe flap drawn around the defect. The area thus outlined was incised deep to adipose tissue with a #15 scalpel blade. The skin margins were undermined to an appropriate distance in all directions utilizing iris scissors. Following this, the designed flap was carried over into the primary defect and sutured into place.
Bilobed Transposition Flap Text: The defect edges were debeveled with a #15 scalpel blade. Given the location of the defect and the proximity to free margins a bilobed transposition flap was deemed most appropriate. Using a sterile surgical marker, an appropriate bilobe flap drawn around the defect. The area thus outlined was incised deep to adipose tissue with a #15 scalpel blade. The skin margins were undermined to an appropriate distance in all directions utilizing iris scissors. Following this, the designed flap was carried over into the primary defect and sutured into place.
Trilobed Flap Text: The defect edges were debeveled with a #15 scalpel blade. Given the location of the defect and the proximity to free margins a trilobed flap was deemed most appropriate. Using a sterile surgical marker, an appropriate trilobed flap was drawn around the defect. The area thus outlined was incised deep to adipose tissue with a #15 scalpel blade. The skin margins were undermined to an appropriate distance in all directions utilizing iris scissors. Following this, the designed flap was carried into the primary defect and sutured into place.
Dorsal Nasal Flap Text: The defect edges were debeveled with a #15 scalpel blade. Given the location of the defect and the proximity to free margins a dorsal nasal flap was deemed most appropriate. Using a sterile surgical marker, an appropriate dorsal nasal flap was drawn around the defect. The area thus outlined was incised deep to adipose tissue with a #15 scalpel blade. The skin margins were undermined to an appropriate distance in all directions utilizing iris scissors. Following this, the designed flap was carried into the primary defect and sutured into place.
Island Pedicle Flap Text: The defect edges were debeveled with a #15 scalpel blade. Given the location of the defect, shape of the defect and the proximity to free margins an island pedicle advancement flap was deemed most appropriate. Using a sterile surgical marker, an appropriate advancement flap was drawn incorporating the defect, outlining the appropriate donor tissue and placing the expected incisions within the relaxed skin tension lines where possible. The area thus outlined was incised deep to adipose tissue with a #15 scalpel blade. The skin margins were undermined to an appropriate distance in all directions around the primary defect and laterally outward around the island pedicle utilizing iris scissors.  There was minimal undermining beneath the pedicle flap. Following this, the flap was carried over into the primary defect and sutured into place.
Island Pedicle Flap With Canthal Suspension Text: The defect edges were debeveled with a #15 scalpel blade. Given the location of the defect, shape of the defect and the proximity to free margins an island pedicle advancement flap was deemed most appropriate. Using a sterile surgical marker, an appropriate advancement flap was drawn incorporating the defect, outlining the appropriate donor tissue and placing the expected incisions within the relaxed skin tension lines where possible. The area thus outlined was incised deep to adipose tissue with a #15 scalpel blade. The skin margins were undermined to an appropriate distance in all directions around the primary defect and laterally outward around the island pedicle utilizing iris scissors.  There was minimal undermining beneath the pedicle flap. A suspension suture was placed in the canthal tendon to prevent tension and prevent ectropion. Following this, the designed flap was placed into the primary defect and sutured into place.
Alar Island Pedicle Flap Text: The defect edges were debeveled with a #15 scalpel blade. Given the location of the defect, shape of the defect and the proximity to the alar rim an island pedicle advancement flap was deemed most appropriate. Using a sterile surgical marker, an appropriate advancement flap was drawn incorporating the defect, outlining the appropriate donor tissue and placing the expected incisions within the nasal ala running parallel to the alar rim. The area thus outlined was incised with a #15 scalpel blade. The skin margins were undermined minimally to an appropriate distance in all directions around the primary defect and laterally outward around the island pedicle utilizing iris scissors.  There was minimal undermining beneath the pedicle flap. Following this, the designed flap was carried over into the primary defect and sutured into place.
Double Island Pedicle Flap Text: The defect edges were debeveled with a #15 scalpel blade. Given the location of the defect, shape of the defect and the proximity to free margins a double island pedicle advancement flap was deemed most appropriate. Using a sterile surgical marker, an appropriate advancement flap was drawn incorporating the defect, outlining the appropriate donor tissue and placing the expected incisions within the relaxed skin tension lines where possible. The area thus outlined was incised deep to adipose tissue with a #15 scalpel blade. The skin margins were undermined to an appropriate distance in all directions around the primary defect and laterally outward around the island pedicle utilizing iris scissors.  There was minimal undermining beneath the pedicle flap. Following this, the flap was carried over into the primary defect and sutured into place.
Island Pedicle Flap-Requiring Vessel Identification Text: The defect edges were debeveled with a #15 scalpel blade. Given the location of the defect, shape of the defect and the proximity to free margins an island pedicle advancement flap was deemed most appropriate. Using a sterile surgical marker, an appropriate advancement flap was drawn, based on the axial vessel mentioned above, incorporating the defect, outlining the appropriate donor tissue and placing the expected incisions within the relaxed skin tension lines where possible. The area thus outlined was incised deep to adipose tissue with a #15 scalpel blade. The skin margins were undermined to an appropriate distance in all directions around the primary defect and laterally outward around the island pedicle utilizing iris scissors.  There was minimal undermining beneath the pedicle flap. Following this, the designed flap was carried over into the primary defect and sutured into place.
Keystone Flap Text: The defect edges were debeveled with a #15 scalpel blade. Given the location of the defect, shape of the defect a keystone flap was deemed most appropriate. Using a sterile surgical marker, an appropriate keystone flap was drawn incorporating the defect, outlining the appropriate donor tissue and placing the expected incisions within the relaxed skin tension lines where possible. The area thus outlined was incised deep to adipose tissue with a #15 scalpel blade. The skin margins were undermined to an appropriate distance in all directions around the primary defect and laterally outward around the flap utilizing iris scissors. Following this, the designed flap was carried into the primary defect and sutured into place.
O-T Plasty Text: The defect edges were debeveled with a #15 scalpel blade. Given the location of the defect, shape of the defect and the proximity to free margins an O-T plasty was deemed most appropriate. Using a sterile surgical marker, an appropriate O-T plasty was drawn incorporating the defect and placing the expected incisions within the relaxed skin tension lines where possible. The area thus outlined was incised deep to adipose tissue with a #15 scalpel blade. The skin margins were undermined to an appropriate distance in all directions utilizing iris scissors. Following this, the designed flap was carried over into the primary defect and sutured into place.
O-Z Plasty Text: The defect edges were debeveled with a #15 scalpel blade. Given the location of the defect, shape of the defect and the proximity to free margins an O-Z plasty (double transposition flap) was deemed most appropriate. Using a sterile surgical marker, the appropriate transposition flaps were drawn incorporating the defect and placing the expected incisions within the relaxed skin tension lines where possible. The area thus outlined was incised deep to adipose tissue with a #15 scalpel blade. The skin margins were undermined to an appropriate distance in all directions utilizing iris scissors. Hemostasis was achieved with electrocautery. The flaps were then transposed and carried over into place, one clockwise and the other counterclockwise, and anchored with interrupted buried subcutaneous sutures.
Double O-Z Plasty Text: The defect edges were debeveled with a #15 scalpel blade. Given the location of the defect, shape of the defect and the proximity to free margins a Double O-Z plasty (double transposition flap) was deemed most appropriate. Using a sterile surgical marker, the appropriate transposition flaps were drawn incorporating the defect and placing the expected incisions within the relaxed skin tension lines where possible. The area thus outlined was incised deep to adipose tissue with a #15 scalpel blade. The skin margins were undermined to an appropriate distance in all directions utilizing iris scissors. Hemostasis was achieved with electrocautery. The flaps were then transposed and carried over into place, one clockwise and the other counterclockwise, and anchored with interrupted buried subcutaneous sutures.
V-Y Plasty Text: The defect edges were debeveled with a #15 scalpel blade. Given the location of the defect, shape of the defect and the proximity to free margins an V-Y advancement flap was deemed most appropriate. Using a sterile surgical marker, an appropriate advancement flap was drawn incorporating the defect and placing the expected incisions within the relaxed skin tension lines where possible. The area thus outlined was incised deep to adipose tissue with a #15 scalpel blade. The skin margins were undermined to an appropriate distance in all directions utilizing iris scissors. Following this, the designed flap was advanced and carried over into the primary defect and sutured into place.
H Plasty Text: Given the location of the defect, shape of the defect and the proximity to free margins a H-plasty was deemed most appropriate for repair. Using a sterile surgical marker, the appropriate advancement arms of the H-plasty were drawn incorporating the defect and placing the expected incisions within the relaxed skin tension lines where possible. The area thus outlined was incised deep to adipose tissue with a #15 scalpel blade. The skin margins were undermined to an appropriate distance in all directions utilizing iris scissors.  The opposing advancement arms were then advanced and carried over into place in opposite direction and anchored with interrupted buried subcutaneous sutures.
W Plasty Text: The lesion was extirpated to the level of the fat with a #15 scalpel blade. Given the location of the defect, shape of the defect and the proximity to free margins a W-plasty was deemed most appropriate for repair. Using a sterile surgical marker, the appropriate transposition arms of the W-plasty were drawn incorporating the defect and placing the expected incisions within the relaxed skin tension lines where possible. The area thus outlined was incised deep to adipose tissue with a #15 scalpel blade. The skin margins were undermined to an appropriate distance in all directions utilizing iris scissors. The opposing transposition arms were then transposed and carried over into place in opposite direction and anchored with interrupted buried subcutaneous sutures.
Z Plasty Text: The lesion was extirpated to the level of the fat with a #15 scalpel blade. Given the location of the defect, shape of the defect and the proximity to free margins a Z-plasty was deemed most appropriate for repair. Using a sterile surgical marker, the appropriate transposition arms of the Z-plasty were drawn incorporating the defect and placing the expected incisions within the relaxed skin tension lines where possible. The area thus outlined was incised deep to adipose tissue with a #15 scalpel blade. The skin margins were undermined to an appropriate distance in all directions utilizing iris scissors. The opposing transposition arms were then transposed and carried over into place in opposite direction and anchored with interrupted buried subcutaneous sutures.
Double Z Plasty Text: The lesion was extirpated to the level of the fat with a #15 scalpel blade. Given the location of the defect, shape of the defect and the proximity to free margins a double Z-plasty was deemed most appropriate for repair. Using a sterile surgical marker, the appropriate transposition arms of the double Z-plasty were drawn incorporating the defect and placing the expected incisions within the relaxed skin tension lines where possible. The area thus outlined was incised deep to adipose tissue with a #15 scalpel blade. The skin margins were undermined to an appropriate distance in all directions utilizing iris scissors. The opposing transposition arms were then transposed and carried over into place in opposite direction and anchored with interrupted buried subcutaneous sutures.
Zygomaticofacial Flap Text: Given the location of the defect, shape of the defect and the proximity to free margins a zygomaticofacial flap was deemed most appropriate for repair. Using a sterile surgical marker, the appropriate flap was drawn incorporating the defect and placing the expected incisions within the relaxed skin tension lines where possible. The area thus outlined was incised deep to adipose tissue with a #15 scalpel blade with preservation of a vascular pedicle.  The skin margins were undermined to an appropriate distance in all directions utilizing iris scissors. The flap was then carried over into the defect and anchored with interrupted buried subcutaneous sutures.
Cheek Interpolation Flap Text: A decision was made to reconstruct the defect utilizing an interpolation axial flap and a staged reconstruction.  A telfa template was made of the defect.  This telfa template was then used to outline the Cheek Interpolation flap.  The donor area for the pedicle flap was then injected with anesthesia.  The flap was excised through the skin and subcutaneous tissue down to the layer of the underlying musculature.  The interpolation flap was carefully excised within this deep plane to maintain its blood supply.  The edges of the donor site were undermined.   The donor site was closed in a primary fashion.  The pedicle was then rotated into position and sutured.  Once the tube was sutured into place, adequate blood supply was confirmed with blanching and refill.  The pedicle was then wrapped with xeroform gauze and dressed appropriately with a telfa and gauze bandage to ensure continued blood supply and protect the attached pedicle.
Cheek-To-Nose Interpolation Flap Text: A decision was made to reconstruct the defect utilizing an interpolation axial flap and a staged reconstruction.  A telfa template was made of the defect.  This telfa template was then used to outline the Cheek-To-Nose Interpolation flap.  The donor area for the pedicle flap was then injected with anesthesia.  The flap was excised through the skin and subcutaneous tissue down to the layer of the underlying musculature.  The interpolation flap was carefully excised within this deep plane to maintain its blood supply.  The edges of the donor site were undermined.   The donor site was closed in a primary fashion.  The pedicle was then rotated into position and sutured.  Once the tube was sutured into place, adequate blood supply was confirmed with blanching and refill.  The pedicle was then wrapped with xeroform gauze and dressed appropriately with a telfa and gauze bandage to ensure continued blood supply and protect the attached pedicle.
Interpolation Flap Text: A decision was made to reconstruct the defect utilizing an interpolation axial flap and a staged reconstruction.  A telfa template was made of the defect.  This telfa template was then used to outline the interpolation flap.  The donor area for the pedicle flap was then injected with anesthesia.  The flap was excised through the skin and subcutaneous tissue down to the layer of the underlying musculature.  The interpolation flap was carefully excised within this deep plane to maintain its blood supply.  The edges of the donor site were undermined.   The donor site was closed in a primary fashion.  The pedicle was then rotated into position and sutured.  Once the tube was sutured into place, adequate blood supply was confirmed with blanching and refill.  The pedicle was then wrapped with xeroform gauze and dressed appropriately with a telfa and gauze bandage to ensure continued blood supply and protect the attached pedicle.
Melolabial Interpolation Flap Text: A decision was made to reconstruct the defect utilizing an interpolation axial flap and a staged reconstruction.  A telfa template was made of the defect.  This telfa template was then used to outline the melolabial interpolation flap.  The donor area for the pedicle flap was then injected with anesthesia.  The flap was excised through the skin and subcutaneous tissue down to the layer of the underlying musculature.  The pedicle flap was carefully excised within this deep plane to maintain its blood supply.  The edges of the donor site were undermined.   The donor site was closed in a primary fashion.  The pedicle was then rotated into position and sutured.  Once the tube was sutured into place, adequate blood supply was confirmed with blanching and refill.  The pedicle was then wrapped with xeroform gauze and dressed appropriately with a telfa and gauze bandage to ensure continued blood supply and protect the attached pedicle.
Mastoid Interpolation Flap Text: A decision was made to reconstruct the defect utilizing an interpolation axial flap and a staged reconstruction.  A telfa template was made of the defect.  This telfa template was then used to outline the mastoid interpolation flap.  The donor area for the pedicle flap was then injected with anesthesia.  The flap was excised through the skin and subcutaneous tissue down to the layer of the underlying musculature.  The pedicle flap was carefully excised within this deep plane to maintain its blood supply.  The edges of the donor site were undermined.   The donor site was closed in a primary fashion.  The pedicle was then rotated into position and sutured.  Once the tube was sutured into place, adequate blood supply was confirmed with blanching and refill.  The pedicle was then wrapped with xeroform gauze and dressed appropriately with a telfa and gauze bandage to ensure continued blood supply and protect the attached pedicle.
Posterior Auricular Interpolation Flap Text: A decision was made to reconstruct the defect utilizing an interpolation axial flap and a staged reconstruction.  A telfa template was made of the defect.  This telfa template was then used to outline the posterior auricular interpolation flap.  The donor area for the pedicle flap was then injected with anesthesia.  The flap was excised through the skin and subcutaneous tissue down to the layer of the underlying musculature.  The pedicle flap was carefully excised within this deep plane to maintain its blood supply.  The edges of the donor site were undermined.   The donor site was closed in a primary fashion.  The pedicle was then rotated into position and sutured.  Once the tube was sutured into place, adequate blood supply was confirmed with blanching and refill.  The pedicle was then wrapped with xeroform gauze and dressed appropriately with a telfa and gauze bandage to ensure continued blood supply and protect the attached pedicle.
Paramedian Forehead Flap Text: A decision was made to reconstruct the defect utilizing an interpolation axial flap and a staged reconstruction.  A telfa template was made of the defect.  This telfa template was then used to outline the paramedian forehead pedicle flap.  The donor area for the pedicle flap was then injected with anesthesia.  The flap was excised through the skin and subcutaneous tissue down to the layer of the underlying musculature.  The pedicle flap was carefully excised within this deep plane to maintain its blood supply.  The edges of the donor site were undermined.   The donor site was closed in a primary fashion.  The pedicle was then rotated into position and sutured.  Once the tube was sutured into place, adequate blood supply was confirmed with blanching and refill.  The pedicle was then wrapped with xeroform gauze and dressed appropriately with a telfa and gauze bandage to ensure continued blood supply and protect the attached pedicle.
Abbe Flap (Upper To Lower Lip) Text: The defect of the lower lip was assessed and measured.  Given the location and size of the defect, an Abbe flap was deemed most appropriate. Using a sterile surgical marker, an appropriate Abbe flap was measured and drawn on the upper lip. Local anesthesia was then infiltrated.  A scalpel was then used to incise the upper lip through and through the skin, vermilion, muscle and mucosa, leaving the flap pedicled on the opposite side.  The flap was then rotated and transferred to the lower lip defect.  The flap was then sutured into place with a three layer technique, closing the orbicularis oris muscle layer with subcutaneous buried sutures, followed by a mucosal layer and an epidermal layer.
Abbe Flap (Lower To Upper Lip) Text: The defect of the upper lip was assessed and measured.  Given the location and size of the defect, an Abbe flap was deemed most appropriate. Using a sterile surgical marker, an appropriate Abbe flap was measured and drawn on the lower lip. Local anesthesia was then infiltrated. A scalpel was then used to incise the upper lip through and through the skin, vermilion, muscle and mucosa, leaving the flap pedicled on the opposite side.  The flap was then rotated and transferred to the lower lip defect.  The flap was then sutured into place with a three layer technique, closing the orbicularis oris muscle layer with subcutaneous buried sutures, followed by a mucosal layer and an epidermal layer.
Estlander Flap (Upper To Lower Lip) Text: The defect of the lower lip was assessed and measured.  Given the location and size of the defect, an Estlander flap was deemed most appropriate. Using a sterile surgical marker, an appropriate Estlander flap was measured and drawn on the upper lip. Local anesthesia was then infiltrated. A scalpel was then used to incise the lateral aspect of the flap, through skin, muscle and mucosa, leaving the flap pedicled medially.  The flap was then rotated and positioned to fill the lower lip defect.  The flap was then sutured into place with a three layer technique, closing the orbicularis oris muscle layer with subcutaneous buried sutures, followed by a mucosal layer and an epidermal layer.
Lip Wedge Excision Repair Text: Given the location of the defect and the proximity to free margins a full thickness wedge repair was deemed most appropriate. Using a sterile surgical marker, the appropriate repair was drawn incorporating the defect and placing the expected incisions perpendicular to the vermilion border.  The vermilion border was also meticulously outlined to ensure appropriate reapproximation during the repair.  The area thus outlined was incised through and through with a #15 scalpel blade.  The muscularis and dermis were reaproximated with deep sutures following hemostasis. Care was taken to realign the vermilion border before proceeding with the superficial closure.  Once the vermilion was realigned the superfical and mucosal closure was finished.
Ftsg Text: The defect edges were debeveled with a #15 scalpel blade. Given the location of the defect, shape of the defect and the proximity to free margins a full thickness skin graft was deemed most appropriate. Using a sterile surgical marker, the primary defect shape was transferred to the donor site. The area thus outlined was incised deep to adipose tissue with a #15 scalpel blade.  The harvested graft was then trimmed of adipose tissue until only dermis and epidermis was left.  The skin margins of the secondary defect were undermined to an appropriate distance in all directions utilizing iris scissors.  The secondary defect was closed with interrupted buried subcutaneous sutures.  The skin edges were then re-apposed with running  sutures.  The skin graft was then placed in the primary defect and oriented appropriately.
Split-Thickness Skin Graft Text: The defect edges were debeveled with a #15 scalpel blade. Given the location of the defect, shape of the defect and the proximity to free margins a split thickness skin graft was deemed most appropriate. Using a sterile surgical marker, the primary defect shape was transferred to the donor site. The split thickness graft was then harvested.  The skin graft was then placed in the primary defect and oriented appropriately.
Pinch Graft Text: The defect edges were debeveled with a #15 scalpel blade. Given the location of the defect, shape of the defect and the proximity to free margins a pinch graft was deemed most appropriate. Using a sterile surgical marker, the primary defect shape was transferred to the donor site. The area thus outlined was incised deep to adipose tissue with a #15 scalpel blade.  The harvested graft was then trimmed of adipose tissue until only dermis and epidermis was left. The skin margins of the secondary defect were undermined to an appropriate distance in all directions utilizing iris scissors.  The secondary defect was closed with interrupted buried subcutaneous sutures.  The skin edges were then re-apposed with running  sutures.  The skin graft was then placed in the primary defect and oriented appropriately.
Burow's Graft Text: The defect edges were debeveled with a #15 scalpel blade. Given the location of the defect, shape of the defect, the proximity to free margins and the presence of a standing cone deformity a Burow's skin graft was deemed most appropriate. The standing cone was removed and this tissue was then trimmed to the shape of the primary defect. The adipose tissue was also removed until only dermis and epidermis were left.  The skin margins of the secondary defect were undermined to an appropriate distance in all directions utilizing iris scissors.  The secondary defect was closed with interrupted buried subcutaneous sutures.  The skin edges were then re-apposed with running  sutures.  The skin graft was then placed in the primary defect and oriented appropriately.
Cartilage Graft Text: The defect edges were debeveled with a #15 scalpel blade. Given the location of the defect, shape of the defect, the fact the defect involved a full thickness cartilage defect a cartilage graft was deemed most appropriate.  An appropriate donor site was identified, cleansed, and anesthetized. The cartilage graft was then harvested and transferred to the recipient site, oriented appropriately and then sutured into place.  The secondary defect was then repaired using a primary closure.
Composite Graft Text: The defect edges were debeveled with a #15 scalpel blade. Given the location of the defect, shape of the defect, the proximity to free margins and the fact the defect was full thickness a composite graft was deemed most appropriate.  The defect was outline and then transferred to the donor site.  A full thickness graft was then excised from the donor site. The graft was then placed in the primary defect, oriented appropriately and then sutured into place.  The secondary defect was then repaired using a primary closure.
Epidermal Autograft Text: The defect edges were debeveled with a #15 scalpel blade. Given the location of the defect, shape of the defect and the proximity to free margins an epidermal autograft was deemed most appropriate. Using a sterile surgical marker, the primary defect shape was transferred to the donor site. The epidermal graft was then harvested.  The skin graft was then placed in the primary defect and oriented appropriately.
Dermal Autograft Text: The defect edges were debeveled with a #15 scalpel blade. Given the location of the defect, shape of the defect and the proximity to free margins a dermal autograft was deemed most appropriate. Using a sterile surgical marker, the primary defect shape was transferred to the donor site. The area thus outlined was incised deep to adipose tissue with a #15 scalpel blade.  The harvested graft was then trimmed of adipose and epidermal tissue until only dermis was left.  The skin graft was then placed in the primary defect and oriented appropriately.
Skin Substitute Text: The defect edges were debeveled with a #15 scalpel blade. Given the location of the defect, shape of the defect and the proximity to free margins a skin substitute graft was deemed most appropriate.  The graft material was trimmed to fit the size of the defect. The graft was then placed in the primary defect and oriented appropriately.
Tissue Cultured Epidermal Autograft Text: The defect edges were debeveled with a #15 scalpel blade. Given the location of the defect, shape of the defect and the proximity to free margins a tissue cultured epidermal autograft was deemed most appropriate.  The graft was then trimmed to fit the size of the defect.  The graft was then placed in the primary defect and oriented appropriately.
Xenograft Text: The defect edges were debeveled with a #15 scalpel blade. Given the location of the defect, shape of the defect and the proximity to free margins a xenograft was deemed most appropriate.  The graft was then trimmed to fit the size of the defect.  The graft was then placed in the primary defect and oriented appropriately.
Purse String (Intermediate) Text: Given the location of the defect and the characteristics of the surrounding skin a purse string intermediate closure was deemed most appropriate.  Undermining was performed circumferentially around the surgical defect.  A purse string suture was then placed and tightened.
Purse String (Simple) Text: Given the location of the defect and the characteristics of the surrounding skin a purse string simple closure was deemed most appropriate.  Undermining was performed circumferentially around the surgical defect.  A purse string suture was then placed and tightened.
Partial Purse String (Intermediate) Text: Given the location of the defect and the characteristics of the surrounding skin an intermediate purse string closure was deemed most appropriate.  Undermining was performed circumferentially around the surgical defect.  A purse string suture was then placed and tightened. Wound tension of the circular defect prevented complete closure of the wound.
Partial Purse String (Simple) Text: Given the location of the defect and the characteristics of the surrounding skin a simple purse string closure was deemed most appropriate.  Undermining was performed circumferentially around the surgical defect.  A purse string suture was then placed and tightened. Wound tension of the circular defect prevented complete closure of the wound.
Complex Repair And Single Advancement Flap Text: The defect edges were debeveled with a #15 scalpel blade.  The primary defect was closed partially with a complex linear closure.  Given the location of the remaining defect, shape of the defect and the proximity to free margins a single advancement flap was deemed most appropriate for complete closure of the defect.  Using a sterile surgical marker, an appropriate advancement flap was drawn incorporating the defect and placing the expected incisions within the relaxed skin tension lines where possible. The area thus outlined was incised deep to adipose tissue with a #15 scalpel blade. The skin margins were undermined to an appropriate distance in all directions utilizing iris scissors and carried over to close the primary defect.
Complex Repair And Double Advancement Flap Text: The defect edges were debeveled with a #15 scalpel blade.  The primary defect was closed partially with a complex linear closure.  Given the location of the remaining defect, shape of the defect and the proximity to free margins a double advancement flap was deemed most appropriate for complete closure of the defect.  Using a sterile surgical marker, an appropriate advancement flap was drawn incorporating the defect and placing the expected incisions within the relaxed skin tension lines where possible. The area thus outlined was incised deep to adipose tissue with a #15 scalpel blade. The skin margins were undermined to an appropriate distance in all directions utilizing iris scissors and carried over to close the primary defect.
Complex Repair And Modified Advancement Flap Text: The defect edges were debeveled with a #15 scalpel blade.  The primary defect was closed partially with a complex linear closure.  Given the location of the remaining defect, shape of the defect and the proximity to free margins a modified advancement flap was deemed most appropriate for complete closure of the defect.  Using a sterile surgical marker, an appropriate advancement flap was drawn incorporating the defect and placing the expected incisions within the relaxed skin tension lines where possible. The area thus outlined was incised deep to adipose tissue with a #15 scalpel blade. The skin margins were undermined to an appropriate distance in all directions utilizing iris scissors and carried over to close the primary defect.
Complex Repair And A-T Advancement Flap Text: The defect edges were debeveled with a #15 scalpel blade.  The primary defect was closed partially with a complex linear closure.  Given the location of the remaining defect, shape of the defect and the proximity to free margins an A-T advancement flap was deemed most appropriate for complete closure of the defect.  Using a sterile surgical marker, an appropriate advancement flap was drawn incorporating the defect and placing the expected incisions within the relaxed skin tension lines where possible. The area thus outlined was incised deep to adipose tissue with a #15 scalpel blade. The skin margins were undermined to an appropriate distance in all directions utilizing iris scissors and carried over to close the primary defect.
Complex Repair And O-T Advancement Flap Text: The defect edges were debeveled with a #15 scalpel blade.  The primary defect was closed partially with a complex linear closure.  Given the location of the remaining defect, shape of the defect and the proximity to free margins an O-T advancement flap was deemed most appropriate for complete closure of the defect.  Using a sterile surgical marker, an appropriate advancement flap was drawn incorporating the defect and placing the expected incisions within the relaxed skin tension lines where possible. The area thus outlined was incised deep to adipose tissue with a #15 scalpel blade. The skin margins were undermined to an appropriate distance in all directions utilizing iris scissors and carried over to close the primary defect.
Complex Repair And O-L Flap Text: The defect edges were debeveled with a #15 scalpel blade.  The primary defect was closed partially with a complex linear closure.  Given the location of the remaining defect, shape of the defect and the proximity to free margins an O-L flap was deemed most appropriate for complete closure of the defect.  Using a sterile surgical marker, an appropriate flap was drawn incorporating the defect and placing the expected incisions within the relaxed skin tension lines where possible. The area thus outlined was incised deep to adipose tissue with a #15 scalpel blade. The skin margins were undermined to an appropriate distance in all directions utilizing iris scissors and carried over to close the primary defect.
Complex Repair And Bilobe Flap Text: The defect edges were debeveled with a #15 scalpel blade.  The primary defect was closed partially with a complex linear closure.  Given the location of the remaining defect, shape of the defect and the proximity to free margins a bilobe flap was deemed most appropriate for complete closure of the defect.  Using a sterile surgical marker, an appropriate advancement flap was drawn incorporating the defect and placing the expected incisions within the relaxed skin tension lines where possible. The area thus outlined was incised deep to adipose tissue with a #15 scalpel blade. The skin margins were undermined to an appropriate distance in all directions utilizing iris scissors and carried over to close the primary defect.
Complex Repair And Melolabial Flap Text: The defect edges were debeveled with a #15 scalpel blade.  The primary defect was closed partially with a complex linear closure.  Given the location of the remaining defect, shape of the defect and the proximity to free margins a melolabial flap was deemed most appropriate for complete closure of the defect.  Using a sterile surgical marker, an appropriate advancement flap was drawn incorporating the defect and placing the expected incisions within the relaxed skin tension lines where possible. The area thus outlined was incised deep to adipose tissue with a #15 scalpel blade. The skin margins were undermined to an appropriate distance in all directions utilizing iris scissors and carried over to close the primary defect.
Complex Repair And Rotation Flap Text: The defect edges were debeveled with a #15 scalpel blade.  The primary defect was closed partially with a complex linear closure.  Given the location of the remaining defect, shape of the defect and the proximity to free margins a rotation flap was deemed most appropriate for complete closure of the defect.  Using a sterile surgical marker, an appropriate advancement flap was drawn incorporating the defect and placing the expected incisions within the relaxed skin tension lines where possible. The area thus outlined was incised deep to adipose tissue with a #15 scalpel blade. The skin margins were undermined to an appropriate distance in all directions utilizing iris scissors and carried over to close the primary defect.
Complex Repair And Rhombic Flap Text: The defect edges were debeveled with a #15 scalpel blade.  The primary defect was closed partially with a complex linear closure.  Given the location of the remaining defect, shape of the defect and the proximity to free margins a rhombic flap was deemed most appropriate for complete closure of the defect.  Using a sterile surgical marker, an appropriate advancement flap was drawn incorporating the defect and placing the expected incisions within the relaxed skin tension lines where possible. The area thus outlined was incised deep to adipose tissue with a #15 scalpel blade. The skin margins were undermined to an appropriate distance in all directions utilizing iris scissors and carried over to close the primary defect.
Complex Repair And Transposition Flap Text: The defect edges were debeveled with a #15 scalpel blade.  The primary defect was closed partially with a complex linear closure.  Given the location of the remaining defect, shape of the defect and the proximity to free margins a transposition flap was deemed most appropriate for complete closure of the defect.  Using a sterile surgical marker, an appropriate advancement flap was drawn incorporating the defect and placing the expected incisions within the relaxed skin tension lines where possible. The area thus outlined was incised deep to adipose tissue with a #15 scalpel blade. The skin margins were undermined to an appropriate distance in all directions utilizing iris scissors and carried over to close the primary defect.
Complex Repair And V-Y Plasty Text: The defect edges were debeveled with a #15 scalpel blade.  The primary defect was closed partially with a complex linear closure.  Given the location of the remaining defect, shape of the defect and the proximity to free margins a V-Y plasty was deemed most appropriate for complete closure of the defect.  Using a sterile surgical marker, an appropriate advancement flap was drawn incorporating the defect and placing the expected incisions within the relaxed skin tension lines where possible. The area thus outlined was incised deep to adipose tissue with a #15 scalpel blade. The skin margins were undermined to an appropriate distance in all directions utilizing iris scissors and carried over to close the primary defect.
Complex Repair And M Plasty Text: The defect edges were debeveled with a #15 scalpel blade.  The primary defect was closed partially with a complex linear closure.  Given the location of the remaining defect, shape of the defect and the proximity to free margins an M plasty was deemed most appropriate for complete closure of the defect.  Using a sterile surgical marker, an appropriate advancement flap was drawn incorporating the defect and placing the expected incisions within the relaxed skin tension lines where possible. The area thus outlined was incised deep to adipose tissue with a #15 scalpel blade. The skin margins were undermined to an appropriate distance in all directions utilizing iris scissors and carried over to close the primary defect.
Complex Repair And Double M Plasty Text: The defect edges were debeveled with a #15 scalpel blade.  The primary defect was closed partially with a complex linear closure.  Given the location of the remaining defect, shape of the defect and the proximity to free margins a double M plasty was deemed most appropriate for complete closure of the defect.  Using a sterile surgical marker, an appropriate advancement flap was drawn incorporating the defect and placing the expected incisions within the relaxed skin tension lines where possible. The area thus outlined was incised deep to adipose tissue with a #15 scalpel blade. The skin margins were undermined to an appropriate distance in all directions utilizing iris scissors and carried over to close the primary defect.
Complex Repair And W Plasty Text: The defect edges were debeveled with a #15 scalpel blade.  The primary defect was closed partially with a complex linear closure.  Given the location of the remaining defect, shape of the defect and the proximity to free margins a W plasty was deemed most appropriate for complete closure of the defect.  Using a sterile surgical marker, an appropriate advancement flap was drawn incorporating the defect and placing the expected incisions within the relaxed skin tension lines where possible. The area thus outlined was incised deep to adipose tissue with a #15 scalpel blade. The skin margins were undermined to an appropriate distance in all directions utilizing iris scissors and carried over to close the primary defect.
Complex Repair And Z Plasty Text: The defect edges were debeveled with a #15 scalpel blade.  The primary defect was closed partially with a complex linear closure.  Given the location of the remaining defect, shape of the defect and the proximity to free margins a Z plasty was deemed most appropriate for complete closure of the defect.  Using a sterile surgical marker, an appropriate advancement flap was drawn incorporating the defect and placing the expected incisions within the relaxed skin tension lines where possible. The area thus outlined was incised deep to adipose tissue with a #15 scalpel blade. The skin margins were undermined to an appropriate distance in all directions utilizing iris scissors and carried over to close the primary defect.
Complex Repair And Dorsal Nasal Flap Text: The defect edges were debeveled with a #15 scalpel blade.  The primary defect was closed partially with a complex linear closure.  Given the location of the remaining defect, shape of the defect and the proximity to free margins a dorsal nasal flap was deemed most appropriate for complete closure of the defect.  Using a sterile surgical marker, an appropriate flap was drawn incorporating the defect and placing the expected incisions within the relaxed skin tension lines where possible. The area thus outlined was incised deep to adipose tissue with a #15 scalpel blade. The skin margins were undermined to an appropriate distance in all directions utilizing iris scissors and carried over to close the primary defect.
Complex Repair And Ftsg Text: The defect edges were debeveled with a #15 scalpel blade.  The primary defect was closed partially with a complex linear closure.  Given the location of the defect, shape of the defect and the proximity to free margins a full thickness skin graft was deemed most appropriate to repair the remaining defect.  The graft was trimmed to fit the size of the remaining defect.  The graft was then placed in the primary defect, oriented appropriately, and sutured into place.
Complex Repair And Burow's Graft Text: The defect edges were debeveled with a #15 scalpel blade.  The primary defect was closed partially with a complex linear closure.  Given the location of the defect, shape of the defect, the proximity to free margins and the presence of a standing cone deformity a Burow's graft was deemed most appropriate to repair the remaining defect.  The graft was trimmed to fit the size of the remaining defect.  The graft was then placed in the primary defect, oriented appropriately, and sutured into place.
Complex Repair And Split-Thickness Skin Graft Text: The defect edges were debeveled with a #15 scalpel blade.  The primary defect was closed partially with a complex linear closure.  Given the location of the defect, shape of the defect and the proximity to free margins a split thickness skin graft was deemed most appropriate to repair the remaining defect.  The graft was trimmed to fit the size of the remaining defect.  The graft was then placed in the primary defect, oriented appropriately, and sutured into place.
Complex Repair And Epidermal Autograft Text: The defect edges were debeveled with a #15 scalpel blade.  The primary defect was closed partially with a complex linear closure.  Given the location of the defect, shape of the defect and the proximity to free margins an epidermal autograft was deemed most appropriate to repair the remaining defect.  The graft was trimmed to fit the size of the remaining defect.  The graft was then placed in the primary defect, oriented appropriately, and sutured into place.
Complex Repair And Dermal Autograft Text: The defect edges were debeveled with a #15 scalpel blade.  The primary defect was closed partially with a complex linear closure.  Given the location of the defect, shape of the defect and the proximity to free margins an dermal autograft was deemed most appropriate to repair the remaining defect.  The graft was trimmed to fit the size of the remaining defect.  The graft was then placed in the primary defect, oriented appropriately, and sutured into place.
Complex Repair And Tissue Cultured Epidermal Autograft Text: The defect edges were debeveled with a #15 scalpel blade.  The primary defect was closed partially with a complex linear closure.  Given the location of the defect, shape of the defect and the proximity to free margins an tissue cultured epidermal autograft was deemed most appropriate to repair the remaining defect.  The graft was trimmed to fit the size of the remaining defect.  The graft was then placed in the primary defect, oriented appropriately, and sutured into place.
Complex Repair And Xenograft Text: The defect edges were debeveled with a #15 scalpel blade.  The primary defect was closed partially with a complex linear closure.  Given the location of the defect, shape of the defect and the proximity to free margins a xenograft was deemed most appropriate to repair the remaining defect.  The graft was trimmed to fit the size of the remaining defect.  The graft was then placed in the primary defect, oriented appropriately, and sutured into place.
Complex Repair And Skin Substitute Graft Text: The defect edges were debeveled with a #15 scalpel blade.  The primary defect was closed partially with a complex linear closure.  Given the location of the remaining defect, shape of the defect and the proximity to free margins a skin substitute graft was deemed most appropriate to repair the remaining defect.  The graft was trimmed to fit the size of the remaining defect.  The graft was then placed in the primary defect, oriented appropriately, and sutured into place.
Consent was obtained from the patient. The risks and benefits to therapy were discussed in detail. Specifically, the risks of infection, scarring, bleeding, prolonged wound healing, incomplete removal, allergy to anesthesia, nerve injury and recurrence were addressed. Prior to the procedure, the treatment site was clearly identified and confirmed by the patient. All components of Universal Protocol/PAUSE Rule completed.
Post-Care Instructions: I reviewed with the patient in detail post-care instructions. Patient is not to engage in any heavy lifting, exercise, or swimming for the next 14 days. Should the patient develop any fevers, chills, bleeding, severe pain patient will contact the office immediately.
Home Suture Removal Text: Patient was provided a home suture removal kit and will remove their sutures at home.  If they have any questions or difficulties they will call the office.
Where Do You Want The Question To Include Opioid Counseling Located?: Case Summary Tab
Information: Selecting Yes will display possible errors in your note based on the variables you have selected. This validation is only offered as a suggestion for you. PLEASE NOTE THAT THE VALIDATION TEXT WILL BE REMOVED WHEN YOU FINALIZE YOUR NOTE. IF YOU WANT TO FAX A PRELIMINARY NOTE YOU WILL NEED TO TOGGLE THIS TO 'NO' IF YOU DO NOT WANT IT IN YOUR FAXED NOTE.

## 2024-07-11 ENCOUNTER — APPOINTMENT (OUTPATIENT)
Dept: URBAN - METROPOLITAN AREA CLINIC 259 | Age: 74
Setting detail: DERMATOLOGY
End: 2024-07-15

## 2024-07-11 DIAGNOSIS — Z48.02 ENCOUNTER FOR REMOVAL OF SUTURES: ICD-10-CM

## 2024-07-11 PROCEDURE — 99024 POSTOP FOLLOW-UP VISIT: CPT

## 2024-07-11 PROCEDURE — OTHER SUTURE REMOVAL (GLOBAL PERIOD): OTHER

## 2024-07-11 ASSESSMENT — LOCATION ZONE DERM: LOCATION ZONE: LEG

## 2024-07-11 ASSESSMENT — LOCATION DETAILED DESCRIPTION DERM: LOCATION DETAILED: RIGHT DISTAL POSTERIOR THIGH

## 2024-07-11 ASSESSMENT — LOCATION SIMPLE DESCRIPTION DERM: LOCATION SIMPLE: RIGHT POSTERIOR THIGH

## 2024-07-11 NOTE — PROCEDURE: SUTURE REMOVAL (GLOBAL PERIOD)
Detail Level: Detailed
Add 20146 Cpt? (Important Note: In 2017 The Use Of 20402 Is Being Tracked By Cms To Determine Future Global Period Reimbursement For Global Periods): yes

## 2024-09-06 ENCOUNTER — APPOINTMENT (OUTPATIENT)
Dept: URBAN - METROPOLITAN AREA CLINIC 259 | Age: 74
Setting detail: DERMATOLOGY
End: 2024-09-09

## 2024-09-06 DIAGNOSIS — D18.0 HEMANGIOMA: ICD-10-CM

## 2024-09-06 DIAGNOSIS — L81.4 OTHER MELANIN HYPERPIGMENTATION: ICD-10-CM

## 2024-09-06 DIAGNOSIS — D22 MELANOCYTIC NEVI: ICD-10-CM

## 2024-09-06 DIAGNOSIS — Z71.89 OTHER SPECIFIED COUNSELING: ICD-10-CM

## 2024-09-06 DIAGNOSIS — Z86.006 PERSONAL HISTORY OF MELANOMA IN-SITU: ICD-10-CM

## 2024-09-06 DIAGNOSIS — D49.2 NEOPLASM OF UNSPECIFIED BEHAVIOR OF BONE, SOFT TISSUE, AND SKIN: ICD-10-CM

## 2024-09-06 DIAGNOSIS — L57.8 OTHER SKIN CHANGES DUE TO CHRONIC EXPOSURE TO NONIONIZING RADIATION: ICD-10-CM

## 2024-09-06 DIAGNOSIS — L82.1 OTHER SEBORRHEIC KERATOSIS: ICD-10-CM

## 2024-09-06 PROBLEM — D18.01 HEMANGIOMA OF SKIN AND SUBCUTANEOUS TISSUE: Status: ACTIVE | Noted: 2024-09-06

## 2024-09-06 PROBLEM — D22.5 MELANOCYTIC NEVI OF TRUNK: Status: ACTIVE | Noted: 2024-09-06

## 2024-09-06 PROCEDURE — 99213 OFFICE O/P EST LOW 20 MIN: CPT | Mod: 25

## 2024-09-06 PROCEDURE — OTHER MIPS QUALITY: OTHER

## 2024-09-06 PROCEDURE — OTHER COUNSELING: OTHER

## 2024-09-06 PROCEDURE — OTHER BIOPSY BY SHAVE METHOD: OTHER

## 2024-09-06 PROCEDURE — 11102 TANGNTL BX SKIN SINGLE LES: CPT

## 2024-09-06 ASSESSMENT — LOCATION SIMPLE DESCRIPTION DERM
LOCATION SIMPLE: LEFT CALF
LOCATION SIMPLE: LEFT UPPER BACK
LOCATION SIMPLE: RIGHT POSTERIOR THIGH

## 2024-09-06 ASSESSMENT — LOCATION DETAILED DESCRIPTION DERM
LOCATION DETAILED: LEFT SUPERIOR MEDIAL UPPER BACK
LOCATION DETAILED: RIGHT DISTAL POSTERIOR THIGH
LOCATION DETAILED: LEFT DISTAL CALF
LOCATION DETAILED: LEFT MEDIAL UPPER BACK

## 2024-09-06 ASSESSMENT — LOCATION ZONE DERM
LOCATION ZONE: LEG
LOCATION ZONE: TRUNK

## 2024-09-06 NOTE — PROCEDURE: BIOPSY BY SHAVE METHOD
Detail Level: Detailed
Depth Of Biopsy: dermis
Was A Bandage Applied: Yes
X Size Of Lesion In Cm: 0
Biopsy Type: H and E
Biopsy Method: double edge Personna blade
Anesthesia Type: 1% Xylocaine with [id number] epinephrine and sodium bicarbonate
Anesthesia Volume In Cc: 0.3
Hemostasis: Aluminum Chloride
Wound Care: Petrolatum
Dressing: bandage
Destruction After The Procedure: No
Type Of Destruction Used: Curettage
Curettage Text: The wound bed was treated with curettage after the biopsy was performed.
Cryotherapy Text: The wound bed was treated with cryotherapy after the biopsy was performed.
Electrodesiccation Text: The wound bed was treated with electrodesiccation after the biopsy was performed.
Electrodesiccation And Curettage Text: The wound bed was treated with electrodesiccation and curettage after the biopsy was performed.
Silver Nitrate Text: The wound bed was treated with silver nitrate after the biopsy was performed.
Lab: -[id number]
Consent: Written consent was obtained and risks were reviewed including but not limited to scarring, infection, bleeding, scabbing, incomplete removal, nerve damage and allergy to anesthesia.
Post-Care Instructions: I reviewed with the patient in detail post-care instructions. Patient is to keep the biopsy site dry overnight, and then apply bacitracin twice daily until healed. Patient may apply hydrogen peroxide soaks to remove any crusting.
Notification Instructions: Patient will be notified of biopsy results. However, patient instructed to call the office if not contacted within 2 weeks.
Billing Type: Third-Party Bill
Information: Selecting Yes will display possible errors in your note based on the variables you have selected. This validation is only offered as a suggestion for you. PLEASE NOTE THAT THE VALIDATION TEXT WILL BE REMOVED WHEN YOU FINALIZE YOUR NOTE. IF YOU WANT TO FAX A PRELIMINARY NOTE YOU WILL NEED TO TOGGLE THIS TO 'NO' IF YOU DO NOT WANT IT IN YOUR FAXED NOTE.

## 2024-12-06 ENCOUNTER — APPOINTMENT (OUTPATIENT)
Dept: URBAN - METROPOLITAN AREA CLINIC 259 | Age: 74
Setting detail: DERMATOLOGY
End: 2024-12-09

## 2024-12-06 VITALS — WEIGHT: 166 LBS | HEIGHT: 64 IN

## 2024-12-06 DIAGNOSIS — L82.0 INFLAMED SEBORRHEIC KERATOSIS: ICD-10-CM

## 2024-12-06 DIAGNOSIS — Z71.89 OTHER SPECIFIED COUNSELING: ICD-10-CM

## 2024-12-06 DIAGNOSIS — L81.4 OTHER MELANIN HYPERPIGMENTATION: ICD-10-CM

## 2024-12-06 DIAGNOSIS — L72.0 EPIDERMAL CYST: ICD-10-CM

## 2024-12-06 DIAGNOSIS — L82.1 OTHER SEBORRHEIC KERATOSIS: ICD-10-CM

## 2024-12-06 DIAGNOSIS — Z86.006 PERSONAL HISTORY OF MELANOMA IN-SITU: ICD-10-CM

## 2024-12-06 DIAGNOSIS — D18.0 HEMANGIOMA: ICD-10-CM

## 2024-12-06 DIAGNOSIS — D22 MELANOCYTIC NEVI: ICD-10-CM

## 2024-12-06 DIAGNOSIS — L57.8 OTHER SKIN CHANGES DUE TO CHRONIC EXPOSURE TO NONIONIZING RADIATION: ICD-10-CM

## 2024-12-06 DIAGNOSIS — L57.0 ACTINIC KERATOSIS: ICD-10-CM

## 2024-12-06 PROBLEM — D22.5 MELANOCYTIC NEVI OF TRUNK: Status: ACTIVE | Noted: 2024-12-06

## 2024-12-06 PROBLEM — D18.01 HEMANGIOMA OF SKIN AND SUBCUTANEOUS TISSUE: Status: ACTIVE | Noted: 2024-12-06

## 2024-12-06 PROCEDURE — OTHER COUNSELING: OTHER

## 2024-12-06 PROCEDURE — 17110 DESTRUCT B9 LESION 1-14: CPT

## 2024-12-06 PROCEDURE — OTHER MIPS QUALITY: OTHER

## 2024-12-06 PROCEDURE — OTHER OTC TREATMENT REGIMEN: OTHER

## 2024-12-06 PROCEDURE — 17000 DESTRUCT PREMALG LESION: CPT | Mod: 59

## 2024-12-06 PROCEDURE — 99213 OFFICE O/P EST LOW 20 MIN: CPT | Mod: 25

## 2024-12-06 PROCEDURE — OTHER LIQUID NITROGEN: OTHER

## 2024-12-06 ASSESSMENT — LOCATION SIMPLE DESCRIPTION DERM
LOCATION SIMPLE: RIGHT FOREARM
LOCATION SIMPLE: LEFT UPPER BACK
LOCATION SIMPLE: RIGHT POSTERIOR THIGH
LOCATION SIMPLE: CHEST
LOCATION SIMPLE: SCALP
LOCATION SIMPLE: RIGHT FOREHEAD
LOCATION SIMPLE: POSTERIOR SCALP

## 2024-12-06 ASSESSMENT — LOCATION DETAILED DESCRIPTION DERM
LOCATION DETAILED: LEFT SUPERIOR MEDIAL UPPER BACK
LOCATION DETAILED: RIGHT SUPERIOR MEDIAL FOREHEAD
LOCATION DETAILED: MID-OCCIPITAL SCALP
LOCATION DETAILED: LEFT MEDIAL UPPER BACK
LOCATION DETAILED: LEFT CENTRAL PARIETAL SCALP
LOCATION DETAILED: RIGHT CENTRAL PARIETAL SCALP
LOCATION DETAILED: LEFT SUPERIOR OCCIPITAL SCALP
LOCATION DETAILED: RIGHT VENTRAL DISTAL FOREARM
LOCATION DETAILED: RIGHT DISTAL POSTERIOR THIGH
LOCATION DETAILED: UPPER STERNUM

## 2024-12-06 ASSESSMENT — LOCATION ZONE DERM
LOCATION ZONE: ARM
LOCATION ZONE: LEG
LOCATION ZONE: FACE
LOCATION ZONE: TRUNK
LOCATION ZONE: SCALP

## 2024-12-06 NOTE — PROCEDURE: OTC TREATMENT REGIMEN
Samples Given: Effaclar adapalene
Detail Level: Zone
Patient Specific Otc Recommendations (Will Not Stick From Patient To Patient): Discussed tx options of prescription retinoids vs OTC retinol. Pt elects OTC samples

## 2024-12-06 NOTE — HPI: FULL BODY SKIN EXAMINATION
What Type Of Note Output Would You Prefer (Optional)?: Standard Output
Detail Level: Detailed
What Is The Reason For Today's Visit?: Full Body Skin Examination
What Is The Reason For Today's Visit? (Being Monitored For X): the development of new lesions
Additional History: Pt reports concern of lesions on the top of the scalp that have been apparent for years. She notes these lesions can often be itchy or tender. She notes she has used prescription shampoos in the past but discontinued. Pt presents for further evaluation.

## 2024-12-06 NOTE — PROCEDURE: LIQUID NITROGEN
PRINCIPAL DISCHARGE DIAGNOSIS  Diagnosis: COVID-19  Assessment and Plan of Treatment: You presented with signs/symptoms of COVID-19. You were treated with IV steroids, remdesivir, and supplemental oxygen. Your condition improved and you no longer require supplemental oxygen. You also developed signs of a UTI- an antibiotic course was started that you can finish as an outpatient. Please be sure to follow-up with your PCP at your earliest convenience. If at any time you feel sudden onset chest pain, shortness of breath, nausea, vomiting, fevers, chills, abdominal pain, etc. please seek immediate medical attention.      
Application Tool (Optional): Liquid Nitrogen Sprayer
Post-Care Instructions: I reviewed with the patient in detail post-care instructions. Patient is to wear sunprotection, and avoid picking at any of the treated lesions. Pt may apply Vaseline to crusted or scabbing areas.
Render Post-Care Instructions In Note?: no
Number Of Freeze-Thaw Cycles: 2 freeze-thaw cycles
Consent: The patient's consent was obtained including but not limited to risks of crusting, scabbing, blistering, scarring, darker or lighter pigmentary change, recurrence, incomplete removal and infection.
Show Aperture Variable?: Yes
Detail Level: Detailed
Duration Of Freeze Thaw-Cycle (Seconds): 0
Medical Necessity Clause: This procedure was medically necessary because the lesions that were treated were:
Medical Necessity Information: It is in your best interest to select a reason for this procedure from the list below. All of these items fulfill various CMS LCD requirements except the new and changing color options.
Duration Of Freeze Thaw-Cycle (Seconds): 5-10
Spray Paint Text: The liquid nitrogen was applied to the skin utilizing a spray paint frosting technique.
PRINCIPAL DISCHARGE DIAGNOSIS  Diagnosis: COVID-19  Assessment and Plan of Treatment: You presented with signs/symptoms of COVID-19. You were treated with IV steroids, remdesivir, and supplemental oxygen. Your condition improved and you no longer require supplemental oxygen. You also developed signs of a UTI- an antibiotic course was started that you can finish as an outpatient. Please be sure to follow-up with your PCP at your earliest convenience. If at any time you feel sudden onset chest pain, shortness of breath, nausea, vomiting, fevers, chills, abdominal pain, etc. please seek immediate medical attention.      SECONDARY DISCHARGE DIAGNOSES  Diagnosis: Bradycardia  Assessment and Plan of Treatment: You developed bradycardia which improved when metoprolol was stopped. Do not resume metoprolol at home.

## 2025-01-12 ENCOUNTER — HEALTH MAINTENANCE LETTER (OUTPATIENT)
Age: 75
End: 2025-01-12

## 2025-04-02 ENCOUNTER — APPOINTMENT (OUTPATIENT)
Dept: URBAN - METROPOLITAN AREA CLINIC 254 | Age: 75
Setting detail: DERMATOLOGY
End: 2025-04-03

## 2025-04-02 DIAGNOSIS — Z71.89 OTHER SPECIFIED COUNSELING: ICD-10-CM

## 2025-04-02 DIAGNOSIS — D18.0 HEMANGIOMA: ICD-10-CM

## 2025-04-02 DIAGNOSIS — L57.8 OTHER SKIN CHANGES DUE TO CHRONIC EXPOSURE TO NONIONIZING RADIATION: ICD-10-CM

## 2025-04-02 DIAGNOSIS — L72.0 EPIDERMAL CYST: ICD-10-CM

## 2025-04-02 DIAGNOSIS — D49.2 NEOPLASM OF UNSPECIFIED BEHAVIOR OF BONE, SOFT TISSUE, AND SKIN: ICD-10-CM

## 2025-04-02 DIAGNOSIS — L82.1 OTHER SEBORRHEIC KERATOSIS: ICD-10-CM

## 2025-04-02 DIAGNOSIS — Z86.006 PERSONAL HISTORY OF MELANOMA IN-SITU: ICD-10-CM

## 2025-04-02 DIAGNOSIS — L81.4 OTHER MELANIN HYPERPIGMENTATION: ICD-10-CM

## 2025-04-02 DIAGNOSIS — D22 MELANOCYTIC NEVI: ICD-10-CM

## 2025-04-02 DIAGNOSIS — L21.8 OTHER SEBORRHEIC DERMATITIS: ICD-10-CM

## 2025-04-02 PROBLEM — D22.5 MELANOCYTIC NEVI OF TRUNK: Status: ACTIVE | Noted: 2025-04-02

## 2025-04-02 PROBLEM — D18.01 HEMANGIOMA OF SKIN AND SUBCUTANEOUS TISSUE: Status: ACTIVE | Noted: 2025-04-02

## 2025-04-02 PROCEDURE — OTHER PRESCRIPTION MEDICATION MANAGEMENT: OTHER

## 2025-04-02 PROCEDURE — 99214 OFFICE O/P EST MOD 30 MIN: CPT | Mod: 25

## 2025-04-02 PROCEDURE — OTHER PRESCRIPTION: OTHER

## 2025-04-02 PROCEDURE — OTHER OTC TREATMENT REGIMEN: OTHER

## 2025-04-02 PROCEDURE — 11102 TANGNTL BX SKIN SINGLE LES: CPT

## 2025-04-02 PROCEDURE — OTHER MIPS QUALITY: OTHER

## 2025-04-02 PROCEDURE — OTHER COUNSELING: OTHER

## 2025-04-02 PROCEDURE — OTHER BIOPSY BY SHAVE METHOD: OTHER

## 2025-04-02 RX ORDER — TRETINOIN 0.25 MG/G
0.025% CREAM TOPICAL QHS
Qty: 45 | Refills: 2 | Status: ERX | COMMUNITY
Start: 2025-04-02

## 2025-04-02 ASSESSMENT — LOCATION DETAILED DESCRIPTION DERM
LOCATION DETAILED: RIGHT DISTAL POSTERIOR THIGH
LOCATION DETAILED: RIGHT SUPERIOR PARIETAL SCALP
LOCATION DETAILED: RIGHT INFERIOR CENTRAL MALAR CHEEK
LOCATION DETAILED: LEFT INFERIOR CENTRAL MALAR CHEEK
LOCATION DETAILED: LEFT SUPERIOR MEDIAL UPPER BACK
LOCATION DETAILED: LEFT MEDIAL UPPER BACK
LOCATION DETAILED: RIGHT ANTERIOR PROXIMAL THIGH

## 2025-04-02 ASSESSMENT — LOCATION SIMPLE DESCRIPTION DERM
LOCATION SIMPLE: LEFT UPPER BACK
LOCATION SIMPLE: RIGHT CHEEK
LOCATION SIMPLE: SCALP
LOCATION SIMPLE: RIGHT POSTERIOR THIGH
LOCATION SIMPLE: LEFT CHEEK
LOCATION SIMPLE: RIGHT THIGH

## 2025-04-02 ASSESSMENT — LOCATION ZONE DERM
LOCATION ZONE: TRUNK
LOCATION ZONE: SCALP
LOCATION ZONE: FACE
LOCATION ZONE: LEG

## 2025-04-02 NOTE — PROCEDURE: BIOPSY BY SHAVE METHOD
Followed by endocrinology Adria.  On Sensipar.  Calcium within normal limits  
Hide Topical Anesthesia?: Yes
Wound Care: Aquaphor
Validate Triangulation: No
Billing Type: Third-Party Bill
Anesthesia Type: 1% lidocaine with epinephrine
Curettage Text: The wound bed was treated with curettage after the biopsy was performed.
Size Of Lesion In Cm: 0
Depth Of Biopsy: dermis
Cryotherapy Text: The wound bed was treated with cryotherapy after the biopsy was performed.
Information: Selecting Yes will display possible errors in your note based on the variables you have selected. This validation is only offered as a suggestion for you. PLEASE NOTE THAT THE VALIDATION TEXT WILL BE REMOVED WHEN YOU FINALIZE YOUR NOTE. IF YOU WANT TO FAX A PRELIMINARY NOTE YOU WILL NEED TO TOGGLE THIS TO 'NO' IF YOU DO NOT WANT IT IN YOUR FAXED NOTE.
Dressing: bandage
Biopsy Method: Dermablade
Post-Care Instructions: WOUND CARE:\\nDo NOT submerge wound in a bath, swimming pool, or hot tub for at least 1 week or for as long as there is an open wound. Gently cleanse the site daily with mild soap and water. Be careful NOT to allow a forceful stream of water to hit the biopsy site. After cleaning/showering, apply a thin layer of petrolatum ointment or Aquaphor in the wound followed by an adhesive bandage. Continue this process daily until healed. \\n\\nBLEEDING:\\nIf you develop persistent bleeding, apply firm and steady pressure over the dressing with gauze for 15 minutes. If bleeding persists, reapply pressure with an ice pack over the gauze for 15 minutes. NEVER APPLY ICE DIRECTLY TO THE SKIN. Do NOT peek under the gauze during these 15 minutes of pressure.  Call our office at [phone] if you cannot get the bleeding to stop. \\n\\nINFECTION:\\nSigns of infection may include increasing rather than decreasing pain (after the first few days), increasing redness/swelling/heat, draining pus, pink/red streaks around the wound, and fever or chills.  Please call our office immediately at [phone] if infection is suspected. It is normal to have some mild redness on or around the wound edges; this will lighten day by day and will become less tender.\\n\\nPAIN:\\nPain is usually minimal, but if needed you may take acetaminophen (Tylenol) every four hours as needed. Applying an ice pack over the dressing for 15-20 minutes every 2-3 hours will relieve swelling, lessen pain, and help minimize bruising. NEVER APPLY ICE DIRECTLY TO THE SKIN. Avoid ibuprofen (Advil, Motrin) and naproxen (Aleve) for the first 48 hours as these can increase bleeding.\\n\\nSWELLING AND BRUISING:\\nSwelling and bruising are common and temporary, usually lasting 1 - 2 weeks. It is more common in areas treated around the eyes, hands, and feet. In the days following the procedure, swelling and bruising can be minimized by keeping the affected areas elevated when possible, reducing salty foods, and applying ice packs over the dressing for 15-20 minutes every 2-3 hours. NEVER APPLY ICE DIRECTLY TO THE SKIN.\\n\\nITCHING:\\nItchiness on and around the wound is very common and can last several days to weeks after surgery. Mild itch is normal as the wound is healing. \\n\\nNERVE CHANGES:\\nNumbness is usually temporary, but it may last for several weeks to months. You may also experience sharp pains at the wound sight as it heals.  Mild pain is normal and will gradually improve with time.\\n \\nNO SMOKING:\\nSmoking will delay the healing process. If you smoke, we recommend trying to quit or at minimum reduce how much you smoke following a procedure.
Electrodesiccation Text: The wound bed was treated with electrodesiccation after the biopsy was performed.
Notification Instructions: - It can take up to 2 -3 weeks to get a biopsy result. \\n- Please refrain from calling to request results until after 2 weeks.
Electrodesiccation And Curettage Text: The wound bed was treated with electrodesiccation and curettage after the biopsy was performed.
Anesthesia Volume In Cc: 0.3
Hemostasis: Drysol
Silver Nitrate Text: The wound bed was treated with silver nitrate after the biopsy was performed.
Type Of Destruction Used: Curettage
Consent: - Consent was obtained and risks were reviewed prior to procedure today. All questions were answered prior to procedure today.\\n- Risks discussed include but are not limited to scarring, infection, bleeding, scabbing, incomplete removal, nerve damage, pain, and allergy to anesthesia.
Biopsy Type: H and E
Lab: -[id number]
Detail Level: Detailed
Medical Necessity Information: It is in your best interest to select a reason for this procedure from the list below. All of these items fulfill various CMS LCD requirements except the new and changing color options.

## 2025-04-02 NOTE — PROCEDURE: COUNSELING
Addended by: BOOM CODY on: 4/29/2023 05:42 PM     Modules accepted: Orders    
Detail Level: Detailed
Quality 137: Melanoma: Continuity Of Care - Recall System: Patient information entered into a recall system that includes: target date for the next exam specified AND a process to follow up with patients regarding missed or unscheduled appointments
Detail Level: Zone
Detail Level: Generalized
Patient Specific Counseling (Will Not Stick From Patient To Patient): - Discussed that this nevus has atypical features that warrant a biopsy.\\n- Patient expressed understanding.\\n- Follow-up for re-examination for regimentation or an additional removal procedure may become necessary depending the pathologist's report.

## 2025-04-02 NOTE — PROCEDURE: OTC TREATMENT REGIMEN
Detail Level: Zone
Patient Specific Otc Recommendations (Will Not Stick From Patient To Patient): - Recommended trialing cerave anti-dandruff shampoo. Samples provided. Pt declined prescription.

## 2025-04-10 ENCOUNTER — APPOINTMENT (OUTPATIENT)
Dept: URBAN - METROPOLITAN AREA CLINIC 252 | Age: 75
Setting detail: DERMATOLOGY
End: 2025-04-10

## 2025-04-10 DIAGNOSIS — F42.4 EXCORIATION (SKIN-PICKING) DISORDER: ICD-10-CM

## 2025-04-10 DIAGNOSIS — T814XXA OTHER POSTOPERATIVE INFECTION: ICD-10-CM

## 2025-04-10 DIAGNOSIS — K6811 OTHER POSTOPERATIVE INFECTION: ICD-10-CM

## 2025-04-10 PROBLEM — T81.40XA INFECTION FOLLOWING A PROCEDURE, UNSPECIFIED, INITIAL ENCOUNTER: Status: ACTIVE | Noted: 2025-04-10

## 2025-04-10 PROCEDURE — OTHER PRESCRIPTION: OTHER

## 2025-04-10 PROCEDURE — OTHER COUNSELING: OTHER

## 2025-04-10 PROCEDURE — 99213 OFFICE O/P EST LOW 20 MIN: CPT

## 2025-04-10 PROCEDURE — OTHER ORDER TESTS: OTHER

## 2025-04-10 RX ORDER — MUPIROCIN 20 MG/G
OINTMENT TOPICAL
Qty: 22 | Refills: 0 | Status: ERX | COMMUNITY
Start: 2025-04-10

## 2025-04-10 ASSESSMENT — LOCATION DETAILED DESCRIPTION DERM
LOCATION DETAILED: RIGHT INFERIOR LATERAL NECK
LOCATION DETAILED: LEFT INFERIOR FOREHEAD
LOCATION DETAILED: RIGHT ANTERIOR PROXIMAL THIGH

## 2025-04-10 ASSESSMENT — LOCATION SIMPLE DESCRIPTION DERM
LOCATION SIMPLE: LEFT FOREHEAD
LOCATION SIMPLE: RIGHT ANTERIOR NECK
LOCATION SIMPLE: RIGHT THIGH

## 2025-04-10 ASSESSMENT — LOCATION ZONE DERM
LOCATION ZONE: LEG
LOCATION ZONE: FACE
LOCATION ZONE: NECK

## 2025-04-10 NOTE — PROCEDURE: ORDER TESTS
Lab Facility: [id number]
Expected Date Of Service: 04/10/2025
Bill For Surgical Tray: no
Billing Type: Third-Party Bill
Performing Laboratory: [id number]

## 2025-04-10 NOTE — PROCEDURE: COUNSELING
Detail Level: Detailed
Patient Specific Counseling (Will Not Stick From Patient To Patient): - pt advised to stop using Neosporin.\\n- bacterial swab was taken will await results.\\n- mupirocin ointment was prescribed

## 2025-04-10 NOTE — HPI: SKIN LESION
Is This A New Presentation, Or A Follow-Up?: Skin Lesion
Additional History: Started getting worse 3 days ago and tenderness only. Improved with neosporine.
Additional History: Maybe started a few days ago.

## 2025-04-29 ENCOUNTER — APPOINTMENT (OUTPATIENT)
Dept: URBAN - METROPOLITAN AREA CLINIC 259 | Age: 75
Setting detail: DERMATOLOGY
End: 2025-04-29

## 2025-04-29 DIAGNOSIS — D22 MELANOCYTIC NEVI: ICD-10-CM

## 2025-04-29 PROBLEM — D22.71 MELANOCYTIC NEVI OF RIGHT LOWER LIMB, INCLUDING HIP: Status: ACTIVE | Noted: 2025-04-29

## 2025-04-29 PROCEDURE — 11301 SHAVE SKIN LESION 0.6-1.0 CM: CPT

## 2025-04-29 PROCEDURE — OTHER SHAVE REMOVAL: OTHER

## 2025-04-29 PROCEDURE — OTHER COUNSELING: OTHER

## 2025-04-29 ASSESSMENT — LOCATION DETAILED DESCRIPTION DERM: LOCATION DETAILED: RIGHT ANTERIOR PROXIMAL THIGH

## 2025-04-29 ASSESSMENT — LOCATION SIMPLE DESCRIPTION DERM: LOCATION SIMPLE: RIGHT THIGH

## 2025-04-29 ASSESSMENT — LOCATION ZONE DERM: LOCATION ZONE: LEG

## 2025-04-29 NOTE — PROCEDURE: SHAVE REMOVAL
Medical Necessity Information: It is in your best interest to select a reason for this procedure from the list below. All of these items fulfill various CMS LCD requirements except the new and changing color options.
Medical Necessity Clause: This procedure was medically necessary because the lesion that was treated was:
Lab: [id number]
Lab Facility: [id number]
Detail Level: Detailed
Was A Bandage Applied: Yes
Size Of Lesion In Cm (Required): 0.9
X Size Of Lesion In Cm (Optional): 0.5
Size Of Margin In Cm (Margins Are Not Added To Billing Dimensions): 0.1
Depth Of Shave: dermis
Biopsy Method: Dermablade
Anesthesia Type: 1% lidocaine with epinephrine and a 1:10 solution of 8.4% sodium bicarbonate
Anesthesia Volume In Cc: 1
Hemostasis: Aluminum Chloride
Wound Care: Petrolatum
Accession #: [id number]
Render Path Notes In Note?: No
Consent was obtained from the patient. The risks and benefits to therapy were discussed in detail. Specifically, the risks of infection, scarring, bleeding, prolonged wound healing, incomplete removal, allergy to anesthesia, nerve injury and recurrence were addressed. Prior to the procedure, the treatment site was clearly identified and confirmed by the patient. All components of Universal Protocol/PAUSE Rule completed.
Post-Care Instructions: I reviewed with the patient in detail post-care instructions. Patient is to keep the biopsy site dry overnight, and then apply bacitracin twice daily until healed. Patient may apply hydrogen peroxide soaks to remove any crusting.
Notification Instructions: Patient will be notified of pathology results. However, patient instructed to call the office if not contacted within 2 weeks.
Billing Type: Third-Party Bill

## 2025-07-08 ENCOUNTER — APPOINTMENT (OUTPATIENT)
Dept: URBAN - METROPOLITAN AREA CLINIC 259 | Age: 75
Setting detail: DERMATOLOGY
End: 2025-07-09

## 2025-07-08 DIAGNOSIS — L81.4 OTHER MELANIN HYPERPIGMENTATION: ICD-10-CM

## 2025-07-08 DIAGNOSIS — D22 MELANOCYTIC NEVI: ICD-10-CM

## 2025-07-08 DIAGNOSIS — L70.0 ACNE VULGARIS: ICD-10-CM

## 2025-07-08 DIAGNOSIS — Z71.89 OTHER SPECIFIED COUNSELING: ICD-10-CM

## 2025-07-08 DIAGNOSIS — D18.0 HEMANGIOMA: ICD-10-CM

## 2025-07-08 DIAGNOSIS — L72.0 EPIDERMAL CYST: ICD-10-CM

## 2025-07-08 DIAGNOSIS — Z87.2 PERSONAL HISTORY OF DISEASES OF THE SKIN AND SUBCUTANEOUS TISSUE: ICD-10-CM

## 2025-07-08 DIAGNOSIS — L57.8 OTHER SKIN CHANGES DUE TO CHRONIC EXPOSURE TO NONIONIZING RADIATION: ICD-10-CM

## 2025-07-08 DIAGNOSIS — Z86.006 PERSONAL HISTORY OF MELANOMA IN-SITU: ICD-10-CM

## 2025-07-08 DIAGNOSIS — L82.1 OTHER SEBORRHEIC KERATOSIS: ICD-10-CM

## 2025-07-08 PROBLEM — D22.5 MELANOCYTIC NEVI OF TRUNK: Status: ACTIVE | Noted: 2025-07-08

## 2025-07-08 PROBLEM — D18.01 HEMANGIOMA OF SKIN AND SUBCUTANEOUS TISSUE: Status: ACTIVE | Noted: 2025-07-08

## 2025-07-08 PROBLEM — D22.72 MELANOCYTIC NEVI OF LEFT LOWER LIMB, INCLUDING HIP: Status: ACTIVE | Noted: 2025-07-08

## 2025-07-08 PROCEDURE — OTHER PRESCRIPTION MEDICATION MANAGEMENT: OTHER

## 2025-07-08 PROCEDURE — OTHER PRESCRIPTION: OTHER

## 2025-07-08 PROCEDURE — OTHER PHOTO-DOCUMENTATION: OTHER

## 2025-07-08 PROCEDURE — OTHER COUNSELING: OTHER

## 2025-07-08 PROCEDURE — 99214 OFFICE O/P EST MOD 30 MIN: CPT

## 2025-07-08 RX ORDER — TRETIONIN 0.25 MG/G
CREAM TOPICAL QHS
Qty: 45 | Refills: 1 | Status: ERX | COMMUNITY
Start: 2025-07-08

## 2025-07-08 ASSESSMENT — LOCATION DETAILED DESCRIPTION DERM
LOCATION DETAILED: LEFT SUPERIOR UPPER BACK
LOCATION DETAILED: LEFT PROXIMAL POSTERIOR THIGH
LOCATION DETAILED: LEFT LATERAL UPPER BACK
LOCATION DETAILED: RIGHT SUPERIOR MEDIAL UPPER BACK
LOCATION DETAILED: LEFT PROXIMAL POSTERIOR UPPER ARM
LOCATION DETAILED: RIGHT SUPERIOR UPPER BACK
LOCATION DETAILED: RIGHT INFERIOR CENTRAL MALAR CHEEK
LOCATION DETAILED: RIGHT PROXIMAL POSTERIOR UPPER ARM
LOCATION DETAILED: LEFT INFERIOR CENTRAL MALAR CHEEK
LOCATION DETAILED: RIGHT ANTERIOR PROXIMAL THIGH
LOCATION DETAILED: LEFT MEDIAL FOREHEAD
LOCATION DETAILED: INFERIOR THORACIC SPINE
LOCATION DETAILED: RIGHT DISTAL POSTERIOR THIGH
LOCATION DETAILED: LEFT MID-UPPER BACK

## 2025-07-08 ASSESSMENT — LOCATION SIMPLE DESCRIPTION DERM
LOCATION SIMPLE: LEFT FOREHEAD
LOCATION SIMPLE: LEFT CHEEK
LOCATION SIMPLE: RIGHT POSTERIOR UPPER ARM
LOCATION SIMPLE: LEFT POSTERIOR UPPER ARM
LOCATION SIMPLE: UPPER BACK
LOCATION SIMPLE: RIGHT CHEEK
LOCATION SIMPLE: RIGHT THIGH
LOCATION SIMPLE: RIGHT UPPER BACK
LOCATION SIMPLE: RIGHT POSTERIOR THIGH
LOCATION SIMPLE: LEFT POSTERIOR THIGH
LOCATION SIMPLE: LEFT UPPER BACK

## 2025-07-08 ASSESSMENT — LOCATION ZONE DERM
LOCATION ZONE: TRUNK
LOCATION ZONE: LEG
LOCATION ZONE: ARM
LOCATION ZONE: FACE